# Patient Record
Sex: MALE | Race: WHITE | Employment: FULL TIME | URBAN - METROPOLITAN AREA
[De-identification: names, ages, dates, MRNs, and addresses within clinical notes are randomized per-mention and may not be internally consistent; named-entity substitution may affect disease eponyms.]

---

## 2018-01-26 ENCOUNTER — TELEPHONE (OUTPATIENT)
Dept: FAMILY MEDICINE CLINIC | Facility: CLINIC | Age: 53
End: 2018-01-26

## 2018-01-26 ENCOUNTER — OFFICE VISIT (OUTPATIENT)
Dept: FAMILY MEDICINE CLINIC | Facility: CLINIC | Age: 53
End: 2018-01-26

## 2018-01-26 VITALS
BODY MASS INDEX: 45.1 KG/M2 | DIASTOLIC BLOOD PRESSURE: 98 MMHG | HEIGHT: 70 IN | RESPIRATION RATE: 20 BRPM | OXYGEN SATURATION: 96 % | HEART RATE: 114 BPM | SYSTOLIC BLOOD PRESSURE: 144 MMHG | WEIGHT: 315 LBS | TEMPERATURE: 99 F

## 2018-01-26 DIAGNOSIS — Z13.21 SCREENING FOR ENDOCRINE, NUTRITIONAL, METABOLIC AND IMMUNITY DISORDER: ICD-10-CM

## 2018-01-26 DIAGNOSIS — Z13.228 SCREENING FOR ENDOCRINE, NUTRITIONAL, METABOLIC AND IMMUNITY DISORDER: ICD-10-CM

## 2018-01-26 DIAGNOSIS — G89.29 CHRONIC LEFT HIP PAIN: Primary | ICD-10-CM

## 2018-01-26 DIAGNOSIS — Z13.29 SCREENING FOR ENDOCRINE, NUTRITIONAL, METABOLIC AND IMMUNITY DISORDER: ICD-10-CM

## 2018-01-26 DIAGNOSIS — M25.562 CHRONIC PAIN OF LEFT KNEE: ICD-10-CM

## 2018-01-26 DIAGNOSIS — Z13.21 ENCOUNTER FOR VITAMIN DEFICIENCY SCREENING: ICD-10-CM

## 2018-01-26 DIAGNOSIS — Z99.89 OBSTRUCTIVE SLEEP APNEA ON CPAP: ICD-10-CM

## 2018-01-26 DIAGNOSIS — G47.33 OBSTRUCTIVE SLEEP APNEA ON CPAP: ICD-10-CM

## 2018-01-26 DIAGNOSIS — Z13.0 SCREENING FOR ENDOCRINE, NUTRITIONAL, METABOLIC AND IMMUNITY DISORDER: ICD-10-CM

## 2018-01-26 DIAGNOSIS — Z12.5 SCREENING FOR PROSTATE CANCER: ICD-10-CM

## 2018-01-26 DIAGNOSIS — E66.01 MORBID OBESITY WITH BMI OF 45.0-49.9, ADULT (HCC): ICD-10-CM

## 2018-01-26 DIAGNOSIS — I10 HYPERTENSION, UNCONTROLLED: ICD-10-CM

## 2018-01-26 DIAGNOSIS — G89.29 CHRONIC PAIN OF LEFT KNEE: ICD-10-CM

## 2018-01-26 DIAGNOSIS — M25.552 CHRONIC LEFT HIP PAIN: Primary | ICD-10-CM

## 2018-01-26 PROCEDURE — 99204 OFFICE O/P NEW MOD 45 MIN: CPT | Performed by: EMERGENCY MEDICINE

## 2018-01-26 RX ORDER — HYDROCODONE BITARTRATE AND ACETAMINOPHEN 5; 325 MG/1; MG/1
1 TABLET ORAL EVERY 6 HOURS PRN
Qty: 30 TABLET | Refills: 0 | Status: SHIPPED | OUTPATIENT
Start: 2018-01-26 | End: 2018-05-07

## 2018-01-26 RX ORDER — IBUPROFEN 800 MG/1
1600 TABLET ORAL EVERY 6 HOURS PRN
COMMUNITY
End: 2018-02-02 | Stop reason: ALTCHOICE

## 2018-01-26 RX ORDER — LISINOPRIL 10 MG/1
10 TABLET ORAL DAILY
Qty: 30 TABLET | Refills: 0 | Status: SHIPPED | OUTPATIENT
Start: 2018-01-26 | End: 2018-02-19

## 2018-01-26 NOTE — PATIENT INSTRUCTIONS
Thank you for choosing [address] [place] [address] Group  To Do:  FOR LIZZY HOLLOWAY  1. Xray today  2. Have ekg done in hospital  3. Have blood tests done after fasting  4. Take only 3-4 tabs every 6-8 hours as needed  5. Take norco for severe pain  6.  Ice to left kne healthiest for you. If you are overweight, a weight loss of only 3% to 5% of your body weight can help lower blood pressure. Generally, a good weight loss goal is to lose 10% of your body weight in a year. · Limit snacks and sweets.   · Get regular exercis or condition. Do you know your risk factors for high blood pressure? You can’t do anything about some risk factors. But other risk factors are things that can be changed. Know what high blood pressure risk factors you have.  Then find out what changes you c your healthcare professional's instructions.

## 2018-01-26 NOTE — PROGRESS NOTES
Chief Complaint:   Patient presents with:  Pain: LT hip, groin, knee x3 months    HPI:   This is a 50year old male       3 months ago fell while walking down a hill and slipped. Has had left hip and left knee pain. Pain waxing and waning.  Pain now more pe hydrated, no distress  SKIN: good skin turgor, no obvious rashes  HEENT: atraumatic, normocephalic, ears, nose and throat are clear  EYES: sclera non icteric bilateral  NECK: supple, no adenopathy, no thyromegaly  LUNGS: clear to auscultation, no RRW  CARD severe pain  Ice to left knee  Follow up in 1-2 weeks for annual physical  Restart lisinopril  Low salt diet  Need to start losing weight.   Overall decreased caloric intake in order to lose weight  Follow up with pulmonology re Sleep apnea

## 2018-01-27 ENCOUNTER — HOSPITAL ENCOUNTER (OUTPATIENT)
Dept: GENERAL RADIOLOGY | Age: 53
Discharge: HOME | End: 2018-01-27
Attending: EMERGENCY MEDICINE
Payer: COMMERCIAL

## 2018-01-27 DIAGNOSIS — G89.29 CHRONIC LEFT HIP PAIN: ICD-10-CM

## 2018-01-27 DIAGNOSIS — M25.552 CHRONIC LEFT HIP PAIN: ICD-10-CM

## 2018-01-27 DIAGNOSIS — M25.562 CHRONIC PAIN OF LEFT KNEE: ICD-10-CM

## 2018-01-27 DIAGNOSIS — G89.29 CHRONIC PAIN OF LEFT KNEE: ICD-10-CM

## 2018-01-27 PROCEDURE — 73560 X-RAY EXAM OF KNEE 1 OR 2: CPT | Performed by: EMERGENCY MEDICINE

## 2018-01-27 PROCEDURE — 73502 X-RAY EXAM HIP UNI 2-3 VIEWS: CPT | Performed by: EMERGENCY MEDICINE

## 2018-01-29 ENCOUNTER — TELEPHONE (OUTPATIENT)
Dept: FAMILY MEDICINE CLINIC | Facility: CLINIC | Age: 53
End: 2018-01-29

## 2018-01-29 DIAGNOSIS — M16.0 OSTEOARTHRITIS OF BOTH HIPS, UNSPECIFIED OSTEOARTHRITIS TYPE: Primary | ICD-10-CM

## 2018-01-29 NOTE — TELEPHONE ENCOUNTER
Patient aware of knee x-ray and hip x-rays. Advised of doctor recommendations for both. Verbalized understanding. Agreeable w/ POC. Contact information provided. Annual physical scheduled.

## 2018-01-29 NOTE — TELEPHONE ENCOUNTER
----- Message from Vamsi White MD sent at 1/29/2018 10:24 AM CST -----  + severe osteoarthritis of both hips, L>R  Pls refer patient to Orthopedics  Dr. Finn Gonsalez surgery  Sports medicine  Joint replacement    [facility] [[email or web address]]

## 2018-02-02 ENCOUNTER — OFFICE VISIT (OUTPATIENT)
Dept: FAMILY MEDICINE CLINIC | Facility: CLINIC | Age: 53
End: 2018-02-02

## 2018-02-02 VITALS
OXYGEN SATURATION: 98 % | RESPIRATION RATE: 20 BRPM | BODY MASS INDEX: 44.1 KG/M2 | HEART RATE: 121 BPM | SYSTOLIC BLOOD PRESSURE: 128 MMHG | HEIGHT: 71 IN | DIASTOLIC BLOOD PRESSURE: 92 MMHG | WEIGHT: 315 LBS | TEMPERATURE: 98 F

## 2018-02-02 DIAGNOSIS — M16.0 PRIMARY OSTEOARTHRITIS OF BOTH HIPS: ICD-10-CM

## 2018-02-02 DIAGNOSIS — I10 ESSENTIAL HYPERTENSION: ICD-10-CM

## 2018-02-02 DIAGNOSIS — Z00.00 ANNUAL PHYSICAL EXAM: Primary | ICD-10-CM

## 2018-02-02 DIAGNOSIS — Z23 NEEDS FLU SHOT: ICD-10-CM

## 2018-02-02 DIAGNOSIS — Z23 NEED FOR TDAP VACCINATION: ICD-10-CM

## 2018-02-02 DIAGNOSIS — E66.01 MORBID OBESITY WITH BODY MASS INDEX OF 50.0-59.9 IN ADULT (HCC): ICD-10-CM

## 2018-02-02 DIAGNOSIS — R00.0 TACHYCARDIA: ICD-10-CM

## 2018-02-02 DIAGNOSIS — Z12.11 SCREENING FOR COLON CANCER: ICD-10-CM

## 2018-02-02 PROCEDURE — 90471 IMMUNIZATION ADMIN: CPT | Performed by: EMERGENCY MEDICINE

## 2018-02-02 PROCEDURE — 90715 TDAP VACCINE 7 YRS/> IM: CPT | Performed by: EMERGENCY MEDICINE

## 2018-02-02 PROCEDURE — 90686 IIV4 VACC NO PRSV 0.5 ML IM: CPT | Performed by: EMERGENCY MEDICINE

## 2018-02-02 PROCEDURE — 90472 IMMUNIZATION ADMIN EACH ADD: CPT | Performed by: EMERGENCY MEDICINE

## 2018-02-02 PROCEDURE — 99396 PREV VISIT EST AGE 40-64: CPT | Performed by: EMERGENCY MEDICINE

## 2018-02-02 NOTE — PATIENT INSTRUCTIONS
Thank you for choosing [facility]  To Do:  FOR LIZZY HOLLOWAY  1. Have blood tests done  2. Arrange for ekg in [address]  3. Arrange for chest xray  4. Close follow up with weight loss clinic  5. Continue with lisinopril  6.  Follow up with pulmonolog infection – talk with your healthcare provider Check with your healthcare provider   Vision All men in this age group Every 2 to 4 years if no risk factors for eye disease2   Vaccine Who needs it How often   Chickenpox (varicella) All men in this age group infection – talk with your healthcare provider At routine exams   Use of daily aspirin Men ages 39 to 78 at risk for cardiovascular health problems At routine exams   Use of tobacco and the health effects it can cause All men in this age group Every exam body acts as if it’s on a desert [address]. It thinks food is scarce, so it slows down your metabolism (how fast you burn calories) to save energy. By eating too few calories, you make it harder to lose weight.   Fiction: “Once I lose weight, I can go back to you've reached it. A goal of reaching a BMI of less than 25 is not always reasonable (or possible).   Make an action plan  Habits don’t change overnight. Setting your goals too high can leave you feeling discouraged if you can’t reach them. Be realistic.  C less fat  A gram of fat has almost 2.5 times the calories of a gram of protein or carbohydrates. Try to balance your food choices so that only 20% to 35% of your calories comes from total fat.  This means an average of 2½ to 3½ grams of fat for each 100 shannon lose weight. And you can keep the weight off, if you make changes slowly and stick with them. Remember that you may never find the perfect time to lose weight. Decide that the right time to be healthier is now.   Common barriers  Barrier 1: I don’t want to 2/2/2016  © 1865-3129 [employer] [address]. [address] [facility] [place] [address] [place] [address]. All rights reserved. This information is not intended as a substitute for professional medical care. Always follow your healthcare professional's instructions.

## 2018-02-02 NOTE — PROGRESS NOTES
Chief Complaint:   Patient presents with:  Physical    HPI:   This is a 50year old male who present for a yearly annual exam    WELL-MALE EXAM     1. Age:   50   2. Have you had any of the following problems:         a.  High blood pressure      YES Heart Disorder Father        Allergies   No Known Allergies      Current Outpatient Prescriptions on File Prior to Visit:  lisinopril 10 MG Oral Tab Take 1 tablet (10 mg total) by mouth daily.  Disp: 30 tablet Rfl: 0   HYDROcodone-acetaminophen (NORCO) 5-32 recommended. Tetanus, Diptheria and Pertussis vaccine should be given every 7-10 years.   Call or come in if there are concerns regarding domestic abuse, sexually transmitted diseases, alcohol/drug addiction, depression/anxiety issues, or any further froylan

## 2018-02-19 DIAGNOSIS — I10 HYPERTENSION, UNCONTROLLED: ICD-10-CM

## 2018-02-20 RX ORDER — LISINOPRIL 10 MG/1
10 TABLET ORAL DAILY
Qty: 30 TABLET | Refills: 0 | Status: SHIPPED | OUTPATIENT
Start: 2018-02-20 | End: 2018-03-19

## 2018-02-24 PROCEDURE — 82272 OCCULT BLD FECES 1-3 TESTS: CPT | Performed by: EMERGENCY MEDICINE

## 2018-03-02 ENCOUNTER — APPOINTMENT (OUTPATIENT)
Dept: LAB | Age: 53
End: 2018-03-02
Attending: EMERGENCY MEDICINE
Payer: COMMERCIAL

## 2018-03-02 DIAGNOSIS — Z12.11 SCREENING FOR COLON CANCER: ICD-10-CM

## 2018-03-19 DIAGNOSIS — I10 HYPERTENSION, UNCONTROLLED: ICD-10-CM

## 2018-03-19 RX ORDER — LISINOPRIL 10 MG/1
TABLET ORAL
Qty: 30 TABLET | Refills: 1 | Status: SHIPPED | OUTPATIENT
Start: 2018-03-19 | End: 2018-05-06

## 2018-03-27 ENCOUNTER — LAB ENCOUNTER (OUTPATIENT)
Dept: LAB | Age: 53
End: 2018-03-27

## 2018-03-27 ENCOUNTER — OFFICE VISIT (OUTPATIENT)
Dept: INTERNAL MEDICINE CLINIC | Facility: CLINIC | Age: 53
End: 2018-03-27

## 2018-03-27 VITALS
HEIGHT: 70 IN | BODY MASS INDEX: 45.1 KG/M2 | HEART RATE: 80 BPM | SYSTOLIC BLOOD PRESSURE: 128 MMHG | WEIGHT: 315 LBS | DIASTOLIC BLOOD PRESSURE: 80 MMHG | RESPIRATION RATE: 16 BRPM

## 2018-03-27 DIAGNOSIS — R94.31 ABNORMAL ECG: ICD-10-CM

## 2018-03-27 DIAGNOSIS — Z51.81 THERAPEUTIC DRUG MONITORING: ICD-10-CM

## 2018-03-27 DIAGNOSIS — I10 ESSENTIAL HYPERTENSION: ICD-10-CM

## 2018-03-27 DIAGNOSIS — Z13.228 SCREENING FOR ENDOCRINE, NUTRITIONAL, METABOLIC AND IMMUNITY DISORDER: ICD-10-CM

## 2018-03-27 DIAGNOSIS — Z12.5 SCREENING FOR PROSTATE CANCER: ICD-10-CM

## 2018-03-27 DIAGNOSIS — E66.01 MORBID OBESITY WITH BMI OF 45.0-49.9, ADULT (HCC): ICD-10-CM

## 2018-03-27 DIAGNOSIS — M25.552 LEFT HIP PAIN: ICD-10-CM

## 2018-03-27 DIAGNOSIS — E78.5 HYPERLIPIDEMIA, UNSPECIFIED HYPERLIPIDEMIA TYPE: ICD-10-CM

## 2018-03-27 DIAGNOSIS — Z13.29 SCREENING FOR ENDOCRINE, NUTRITIONAL, METABOLIC AND IMMUNITY DISORDER: ICD-10-CM

## 2018-03-27 DIAGNOSIS — G47.33 OBSTRUCTIVE SLEEP APNEA SYNDROME: ICD-10-CM

## 2018-03-27 DIAGNOSIS — Z13.21 ENCOUNTER FOR VITAMIN DEFICIENCY SCREENING: ICD-10-CM

## 2018-03-27 DIAGNOSIS — M16.0 PRIMARY OSTEOARTHRITIS OF BOTH HIPS: Primary | ICD-10-CM

## 2018-03-27 DIAGNOSIS — Z13.21 SCREENING FOR ENDOCRINE, NUTRITIONAL, METABOLIC AND IMMUNITY DISORDER: ICD-10-CM

## 2018-03-27 DIAGNOSIS — Z13.0 SCREENING FOR ENDOCRINE, NUTRITIONAL, METABOLIC AND IMMUNITY DISORDER: ICD-10-CM

## 2018-03-27 LAB
25-HYDROXYVITAMIN D (TOTAL): 23.2 NG/ML (ref 30–100)
BASOPHILS # BLD AUTO: 0.04 X10(3) UL (ref 0–0.1)
BASOPHILS NFR BLD AUTO: 0.5 %
COMPLEXED PSA SERPL-MCNC: 1.81 NG/ML (ref 0.01–4)
EOSINOPHIL # BLD AUTO: 0.06 X10(3) UL (ref 0–0.3)
EOSINOPHIL NFR BLD AUTO: 0.7 %
ERYTHROCYTE [DISTWIDTH] IN BLOOD BY AUTOMATED COUNT: 13.3 % (ref 11.5–16)
EST. AVERAGE GLUCOSE BLD GHB EST-MCNC: 123 MG/DL (ref 68–126)
FOLATE (FOLIC ACID), SERUM: 35.2 NG/ML (ref 8.7–24)
HAV AB SERPL IA-ACNC: 775 PG/ML (ref 193–986)
HBA1C MFR BLD HPLC: 5.9 % (ref ?–5.7)
HCT VFR BLD AUTO: 44.1 % (ref 37–53)
HGB BLD-MCNC: 14.2 G/DL (ref 13–17)
IMMATURE GRANULOCYTE COUNT: 0.05 X10(3) UL (ref 0–1)
IMMATURE GRANULOCYTE RATIO %: 0.6 %
LARGE PLATELETS: PRESENT
LYMPHOCYTES # BLD AUTO: 2.52 X10(3) UL (ref 0.9–4)
LYMPHOCYTES NFR BLD AUTO: 29.1 %
MCH RBC QN AUTO: 29.4 PG (ref 27–33.2)
MCHC RBC AUTO-ENTMCNC: 32.2 G/DL (ref 31–37)
MCV RBC AUTO: 91.3 FL (ref 80–99)
MONOCYTES # BLD AUTO: 0.7 X10(3) UL (ref 0.1–1)
MONOCYTES NFR BLD AUTO: 8.1 %
MORPHOLOGY: NORMAL
NEUTROPHIL ABS PRELIM: 5.3 X10 (3) UL (ref 1.3–6.7)
NEUTROPHILS # BLD AUTO: 5.3 X10(3) UL (ref 1.3–6.7)
NEUTROPHILS NFR BLD AUTO: 61 %
PLATELET # BLD AUTO: 295 10(3)UL (ref 150–450)
RBC # BLD AUTO: 4.83 X10(6)UL (ref 4.3–5.7)
RED CELL DISTRIBUTION WIDTH-SD: 44.7 FL (ref 35.1–46.3)
TSI SER-ACNC: 1.84 MIU/ML (ref 0.35–5.5)
WBC # BLD AUTO: 8.7 X10(3) UL (ref 4–13)

## 2018-03-27 PROCEDURE — 82607 VITAMIN B-12: CPT | Performed by: INTERNAL MEDICINE

## 2018-03-27 PROCEDURE — 82746 ASSAY OF FOLIC ACID SERUM: CPT | Performed by: INTERNAL MEDICINE

## 2018-03-27 PROCEDURE — 99204 OFFICE O/P NEW MOD 45 MIN: CPT | Performed by: INTERNAL MEDICINE

## 2018-03-27 PROCEDURE — G0103 PSA SCREENING: HCPCS | Performed by: EMERGENCY MEDICINE

## 2018-03-27 PROCEDURE — 82397 CHEMILUMINESCENT ASSAY: CPT | Performed by: INTERNAL MEDICINE

## 2018-03-27 PROCEDURE — 83036 HEMOGLOBIN GLYCOSYLATED A1C: CPT | Performed by: EMERGENCY MEDICINE

## 2018-03-27 PROCEDURE — 85025 COMPLETE CBC W/AUTO DIFF WBC: CPT | Performed by: EMERGENCY MEDICINE

## 2018-03-27 PROCEDURE — 93000 ELECTROCARDIOGRAM COMPLETE: CPT | Performed by: INTERNAL MEDICINE

## 2018-03-27 PROCEDURE — 82306 VITAMIN D 25 HYDROXY: CPT | Performed by: EMERGENCY MEDICINE

## 2018-03-27 PROCEDURE — 84443 ASSAY THYROID STIM HORMONE: CPT | Performed by: EMERGENCY MEDICINE

## 2018-03-27 RX ORDER — TOPIRAMATE 25 MG/1
25 TABLET ORAL 2 TIMES DAILY
Qty: 60 TABLET | Refills: 0 | Status: SHIPPED | OUTPATIENT
Start: 2018-03-27 | End: 2018-04-25

## 2018-03-27 NOTE — PROGRESS NOTES
HISTORY OF PRESENT ILLNESS  Patient presents with:  Weight Problem: started eating breakfast and protien powder, needs hip sx       Angie Gibson is a 50year old male new to our office today for initiation of medical weight loss program.  Patient present negative   Constipation: negative  DVT: negative    Family or personal history of Pancreatic issues / Medullary Thyroid Cancer: negative    History of bariatric surgery: negative     [address] reviewed: obesity in parent/s or sibling: YES     REVIEW OF SYSTEMS 11/21/2014   K 4.1 11/21/2014    11/21/2014   CO2 28 11/21/2014       Lab Results  Component Value Date    03/27/2018   A1C 5.9 (H) 03/27/2018       Lab Results  Component Value Date   CHOLEST 177 11/21/2014   HDL 72 11/21/2014   LDL 96 11/21/ REFERRAL TO [place] [facility]  -     CARD ECHO STRESS ECHO/REST AND STRESS(CPT=93350/34937 [facility] 69127); Future    Obstructive sleep apnea syndrome  -     CARD ECHO STRESS ECHO/REST AND STRESS(CPT=93350/96461 [facility] 00124);  Future    Therapeutic d boiled egg, any eggs, whole grain toast or english muffin with peanut butter  3. Reduce carbohydrates which includes sweets as well as rice, pasta, potatoes and bread and choose whole grain options when having them    Please download garrett \"My Fitness Pal\"

## 2018-03-29 LAB — LEPTIN: 27.2 NG/ML

## 2018-04-02 ENCOUNTER — TELEPHONE (OUTPATIENT)
Dept: FAMILY MEDICINE CLINIC | Facility: CLINIC | Age: 53
End: 2018-04-02

## 2018-04-02 DIAGNOSIS — E55.9 VITAMIN D DEFICIENCY: Primary | ICD-10-CM

## 2018-04-02 RX ORDER — ERGOCALCIFEROL 1.25 MG/1
50000 CAPSULE ORAL WEEKLY
Qty: 4 CAPSULE | Refills: 2 | Status: SHIPPED | OUTPATIENT
Start: 2018-04-02 | End: 2018-09-17

## 2018-04-02 NOTE — TELEPHONE ENCOUNTER
Discussed all results with patient understanding voiced and he states he will return to get LIPID and CMP done as he was not fasting when these labs were drawn.

## 2018-04-02 NOTE — TELEPHONE ENCOUNTER
----- Message from Mitch Gutierrez MD sent at 4/1/2018  8:10 PM CDT -----  Low vitamin D, needs vitamin D 50,000 units q weekly #4 with 2 refills then, OTC VIT D 9962-1679 units q daily maintenance   Repeat Vitamin D in 6 months.      Rest of labs stable

## 2018-04-17 ENCOUNTER — OFFICE VISIT (OUTPATIENT)
Dept: INTERNAL MEDICINE CLINIC | Facility: CLINIC | Age: 53
End: 2018-04-17

## 2018-04-17 DIAGNOSIS — E66.01 CLASS 3 SEVERE OBESITY DUE TO EXCESS CALORIES WITH SERIOUS COMORBIDITY AND BODY MASS INDEX (BMI) OF 50.0 TO 59.9 IN ADULT (HCC): Primary | ICD-10-CM

## 2018-04-17 PROCEDURE — 97802 MEDICAL NUTRITION INDIV IN: CPT | Performed by: DIETITIAN, REGISTERED

## 2018-04-18 VITALS — WEIGHT: 315 LBS | BODY MASS INDEX: 55 KG/M2

## 2018-04-18 NOTE — PROGRESS NOTES
INITIAL OUTPATIENT NUTRITION CONSULTATION    Nutrition Assessment    Medical Diagnosis: Obesity    Physical Findings: knee pain    Client Age and Gender: 50year old male    Marital Status and Occupation: Single.   Works from home as  in [employer]   Comment:     Comment:    HDL Cholesterol values >59 mg/dL are associated with reduced     cardiac risk.                             ----------    AST (SGOT)   Date Value Ref Range Status   11/21/2014 14 0 - 40 international units per liter Final education and evaluation provided for weight loss. Pt started drinking protein shakes for breakfast in January and lost 20 pounds prior to first appt at [facility] 3/27. Since first appt has started using [employer] garrett to track food intake as advised by Dr. Mara Martin.   Pt

## 2018-04-19 RX ORDER — TOPIRAMATE 25 MG/1
TABLET ORAL
Qty: 60 TABLET | Refills: 0 | OUTPATIENT
Start: 2018-04-19

## 2018-04-19 NOTE — TELEPHONE ENCOUNTER
Topiramate refill denied. Patient has a follow up appt on 4/25/18 with Dr. Kelly Norman and will be refilled at that time if continued.     Future Appointments  Date Time Provider Jazmyn Torres   4/25/2018 8:20 AM Tanya Root MD [address]   5/4/[birth year]

## 2018-04-25 ENCOUNTER — OFFICE VISIT (OUTPATIENT)
Dept: INTERNAL MEDICINE CLINIC | Facility: CLINIC | Age: 53
End: 2018-04-25

## 2018-04-25 VITALS
HEIGHT: 70 IN | HEART RATE: 84 BPM | BODY MASS INDEX: 45.1 KG/M2 | DIASTOLIC BLOOD PRESSURE: 80 MMHG | SYSTOLIC BLOOD PRESSURE: 130 MMHG | WEIGHT: 315 LBS | RESPIRATION RATE: 16 BRPM

## 2018-04-25 DIAGNOSIS — Z51.81 THERAPEUTIC DRUG MONITORING: Primary | ICD-10-CM

## 2018-04-25 DIAGNOSIS — E66.01 OBESITY, MORBID, BMI 50 OR HIGHER (HCC): ICD-10-CM

## 2018-04-25 PROCEDURE — 99213 OFFICE O/P EST LOW 20 MIN: CPT | Performed by: INTERNAL MEDICINE

## 2018-04-25 RX ORDER — TOPIRAMATE 25 MG/1
25 TABLET ORAL 2 TIMES DAILY
Qty: 60 TABLET | Refills: 0 | Status: SHIPPED | OUTPATIENT
Start: 2018-04-25 | End: 2018-05-21

## 2018-04-25 NOTE — PROGRESS NOTES
HISTORY OF PRESENT ILLNESS  Patient presents with:  Weight Check: down 9 lb      Keiry Madison is a 50year old male here for follow up in medical weight loss program.     Denies chest pain, shortness of breath, dizziness, blurred vision, headache, parest Results  Component Value Date   CHOLEST 177 11/21/2014   HDL 72 11/21/2014   LDL 96 11/21/2014   VLDL 9 11/21/2014   NONHDLC 105 11/21/2014       Lab Results  Component Value Date   TSH 1.840 03/27/2018       Lab Results  Component Value Date   B12 775 03/ as recommended. · Discussed the role of sleep and stress in weight management. · Counseled on comprehensive weight loss plan including attention to nutrition, exercise and behavior/stress management for success.  See patient instruction below for more det

## 2018-05-06 DIAGNOSIS — I10 HYPERTENSION, UNCONTROLLED: ICD-10-CM

## 2018-05-07 RX ORDER — LISINOPRIL 10 MG/1
TABLET ORAL
Qty: 30 TABLET | Refills: 0 | Status: SHIPPED | OUTPATIENT
Start: 2018-05-07 | End: 2018-05-30

## 2018-05-17 ENCOUNTER — OFFICE VISIT (OUTPATIENT)
Dept: FAMILY MEDICINE CLINIC | Facility: CLINIC | Age: 53
End: 2018-05-17

## 2018-05-17 VITALS
BODY MASS INDEX: 45.1 KG/M2 | RESPIRATION RATE: 16 BRPM | WEIGHT: 315 LBS | TEMPERATURE: 99 F | SYSTOLIC BLOOD PRESSURE: 130 MMHG | HEIGHT: 70 IN | HEART RATE: 100 BPM | OXYGEN SATURATION: 98 % | DIASTOLIC BLOOD PRESSURE: 80 MMHG

## 2018-05-17 DIAGNOSIS — I10 ESSENTIAL HYPERTENSION: Primary | ICD-10-CM

## 2018-05-17 DIAGNOSIS — E66.01 MORBID OBESITY (HCC): ICD-10-CM

## 2018-05-17 PROCEDURE — 99213 OFFICE O/P EST LOW 20 MIN: CPT | Performed by: EMERGENCY MEDICINE

## 2018-05-17 NOTE — PATIENT INSTRUCTIONS
Thank you for choosing [facility]  To Do:  FOR LIZZY HOLLOWAY  1. Continue with weight loss program  2. Continue with lisinopril  3. Have blood test done for cholesterol  4. Follow up in 6 months  5.  Low salt diet  6. HOme BP monitoring 2 times a This information is not intended as a substitute for professional medical care. Always follow your healthcare professional's instructions.

## 2018-05-17 NOTE — PROGRESS NOTES
Chief Complaint:   Patient presents with:  Weight Check: f/u    HPI:   This is a 50year old male     FF UP WEIGHT LOSS  Hs lost total 40 lbs. Is on Topamax. Follows up with weight loss clinic, Dr. Marline Engle. Known with severe hip osteoarthritis.    Feels well t systems is negative except those stated as above    PHYSICAL EXAM:   /80   Pulse 100   Temp 99 °F (37.2 °C) (Oral)   Resp 16   Ht 70\"   Wt (!) 368 lb   SpO2 98%   BMI 52.80 kg/m²  Estimated body mass index is 52.8 kg/m² as calculated from the follow clinic.         PATIENT INSTRUCTIONS:  Continue with weight loss program  Continue with lisinopril  Have blood test done for cholesterol  Follow up in 6 months  Low salt diet  HOme BP monitoring 2 times a week    FOLLOW UP: 6 months

## 2018-05-21 ENCOUNTER — OFFICE VISIT (OUTPATIENT)
Dept: INTERNAL MEDICINE CLINIC | Facility: CLINIC | Age: 53
End: 2018-05-21

## 2018-05-21 VITALS
BODY MASS INDEX: 45.1 KG/M2 | HEART RATE: 84 BPM | RESPIRATION RATE: 16 BRPM | SYSTOLIC BLOOD PRESSURE: 126 MMHG | DIASTOLIC BLOOD PRESSURE: 80 MMHG | WEIGHT: 315 LBS | HEIGHT: 70 IN

## 2018-05-21 DIAGNOSIS — Z51.81 THERAPEUTIC DRUG MONITORING: Primary | ICD-10-CM

## 2018-05-21 DIAGNOSIS — G47.33 OBSTRUCTIVE SLEEP APNEA SYNDROME: ICD-10-CM

## 2018-05-21 DIAGNOSIS — M16.0 PRIMARY OSTEOARTHRITIS OF BOTH HIPS: ICD-10-CM

## 2018-05-21 PROCEDURE — 99214 OFFICE O/P EST MOD 30 MIN: CPT | Performed by: INTERNAL MEDICINE

## 2018-05-21 RX ORDER — TOPIRAMATE 25 MG/1
25 TABLET ORAL 2 TIMES DAILY
Qty: 60 TABLET | Refills: 0 | Status: SHIPPED | OUTPATIENT
Start: 2018-05-21 | End: 2018-06-20

## 2018-05-21 NOTE — PROGRESS NOTES
HISTORY OF PRESENT ILLNESS  Patient presents with:  Weight Check: down 11 lb      Sheryl Fowler is a 50year old male here for follow up in medical weight loss program.     Denies chest pain, shortness of breath, dizziness, blurred vision, headache, pares 11/21/2014   CO2 28 11/21/2014       Lab Results  Component Value Date    03/27/2018   A1C 5.9 (H) 03/27/2018       Lab Results  Component Value Date   CHOLEST 177 11/21/2014   HDL 72 11/21/2014   LDL 96 11/21/2014   VLDL 9 11/21/2014   NONHDLC 105 work towards complete reversal of the sleep apnea  · Nutrition: low carb diet/ recommended to eat breakfast daily/ regular protein intake  · Medication use and side effects reviewed with patient.   Medication contraindications: n/a   · Follow up with dietit

## 2018-05-30 ENCOUNTER — OFFICE VISIT (OUTPATIENT)
Dept: INTERNAL MEDICINE CLINIC | Facility: CLINIC | Age: 53
End: 2018-05-30

## 2018-05-30 DIAGNOSIS — E66.2 CLASS 3 OBESITY WITH ALVEOLAR HYPOVENTILATION, SERIOUS COMORBIDITY, AND BODY MASS INDEX (BMI) OF 50.0 TO 59.9 IN ADULT (HCC): Primary | ICD-10-CM

## 2018-05-30 DIAGNOSIS — I10 HYPERTENSION, UNCONTROLLED: ICD-10-CM

## 2018-05-30 PROCEDURE — 97803 MED NUTRITION INDIV SUBSEQ: CPT | Performed by: DIETITIAN, REGISTERED

## 2018-05-30 RX ORDER — LISINOPRIL 10 MG/1
TABLET ORAL
Qty: 90 TABLET | Refills: 1 | Status: SHIPPED | OUTPATIENT
Start: 2018-05-30 | End: 2018-11-19

## 2018-05-30 NOTE — TELEPHONE ENCOUNTER
Medication(s) to Refill:   Pending Prescriptions Disp Refills    LISINOPRIL 10 MG Oral Tab [Pharmacy Med Name: LISINOPRIL 10MG TABLETS] 30 tablet 0     Sig: TAKE 1 TABLET(10 MG) BY MOUTH DAILY             Reason for Medication Refill being sent to Provider

## 2018-05-31 VITALS — BODY MASS INDEX: 52 KG/M2 | WEIGHT: 315 LBS

## 2018-05-31 NOTE — PROGRESS NOTES
FOLLOW UP NUTRITION CONSULTATION    Nutrition Assessment    Number of consultations with dietitian: 2    Height:  Ht Readings from Last 1 Encounters:  05/21/18 : 70\"      Weight:   Wt Readings from Last 2 Encounters:  05/30/18 : (!) 361 lb  05/21/18 :

## 2018-06-20 ENCOUNTER — OFFICE VISIT (OUTPATIENT)
Dept: INTERNAL MEDICINE CLINIC | Facility: CLINIC | Age: 53
End: 2018-06-20

## 2018-06-20 VITALS
HEART RATE: 80 BPM | HEIGHT: 70 IN | SYSTOLIC BLOOD PRESSURE: 118 MMHG | WEIGHT: 315 LBS | RESPIRATION RATE: 16 BRPM | DIASTOLIC BLOOD PRESSURE: 80 MMHG | BODY MASS INDEX: 45.1 KG/M2

## 2018-06-20 DIAGNOSIS — Z51.81 THERAPEUTIC DRUG MONITORING: Primary | ICD-10-CM

## 2018-06-20 PROCEDURE — 99213 OFFICE O/P EST LOW 20 MIN: CPT | Performed by: INTERNAL MEDICINE

## 2018-06-20 RX ORDER — TOPIRAMATE 25 MG/1
25 TABLET ORAL 2 TIMES DAILY
Qty: 60 TABLET | Refills: 0 | Status: SHIPPED | OUTPATIENT
Start: 2018-06-20 | End: 2018-07-16

## 2018-06-20 NOTE — PROGRESS NOTES
HISTORY OF PRESENT ILLNESS  Patient presents with:  Weight Check: down 17 lb      Fahad Higuera is a 50year old male here for follow up in medical weight loss program.     Denies chest pain, shortness of breath, dizziness, blurred vision, headache, pares Results  Component Value Date    03/27/2018   A1C 5.9 (H) 03/27/2018       Lab Results  Component Value Date   CHOLEST 177 11/21/2014   HDL 72 11/21/2014   LDL 96 11/21/2014   VLDL 9 11/21/2014   NONHDLC 105 11/21/2014       Lab Results  Component V carb diet/ recommended to eat breakfast daily/ regular protein intake  · Medication use and side effects reviewed with patient. Medication contraindications: n/a   · Follow up with dietitian and psychologist as recommended.   · Discussed the role of sleep

## 2018-07-16 ENCOUNTER — OFFICE VISIT (OUTPATIENT)
Dept: INTERNAL MEDICINE CLINIC | Facility: CLINIC | Age: 53
End: 2018-07-16

## 2018-07-16 VITALS
HEART RATE: 78 BPM | BODY MASS INDEX: 45.1 KG/M2 | WEIGHT: 315 LBS | HEIGHT: 70 IN | RESPIRATION RATE: 16 BRPM | DIASTOLIC BLOOD PRESSURE: 78 MMHG | SYSTOLIC BLOOD PRESSURE: 130 MMHG

## 2018-07-16 DIAGNOSIS — M16.0 PRIMARY OSTEOARTHRITIS OF BOTH HIPS: ICD-10-CM

## 2018-07-16 DIAGNOSIS — Z51.81 THERAPEUTIC DRUG MONITORING: Primary | ICD-10-CM

## 2018-07-16 DIAGNOSIS — I10 ESSENTIAL HYPERTENSION: ICD-10-CM

## 2018-07-16 PROCEDURE — 99214 OFFICE O/P EST MOD 30 MIN: CPT | Performed by: INTERNAL MEDICINE

## 2018-07-16 RX ORDER — TOPIRAMATE 25 MG/1
25 TABLET ORAL 2 TIMES DAILY
Qty: 60 TABLET | Refills: 0 | Status: SHIPPED | OUTPATIENT
Start: 2018-07-16 | End: 2018-08-13

## 2018-07-16 NOTE — PROGRESS NOTES
HISTORY OF PRESENT ILLNESS  Patient presents with:  Weight Check: down 16 lb      Kodi Freedman is a 50year old male here for follow up in medical weight loss program.     Denies chest pain, shortness of breath, dizziness, blurred vision, headache, pares 11/21/2014   TP 6.7 11/21/2014   ALB 4.3 11/21/2014   AGRATIO 1.8 11/21/2014    11/21/2014   K 4.1 11/21/2014    11/21/2014   CO2 28 11/21/2014       Lab Results  Component Value Date    03/27/2018   A1C 5.9 (H) 03/27/2018       Lab Resu aqua therapy for the hip.  Not interested at this itme, interested in seeing surgeon we dicussed when BMI near 40   · Down 10% of total body weight, doing very well, we reviewed over the next 40-60 lb hopefully we can work towards complete reversal of the s

## 2018-08-13 ENCOUNTER — OFFICE VISIT (OUTPATIENT)
Dept: INTERNAL MEDICINE CLINIC | Facility: CLINIC | Age: 53
End: 2018-08-13
Payer: COMMERCIAL

## 2018-08-13 VITALS
RESPIRATION RATE: 16 BRPM | HEART RATE: 84 BPM | SYSTOLIC BLOOD PRESSURE: 122 MMHG | BODY MASS INDEX: 45.1 KG/M2 | HEIGHT: 70 IN | DIASTOLIC BLOOD PRESSURE: 78 MMHG | WEIGHT: 315 LBS

## 2018-08-13 DIAGNOSIS — M16.0 PRIMARY OSTEOARTHRITIS OF BOTH HIPS: ICD-10-CM

## 2018-08-13 DIAGNOSIS — Z51.81 THERAPEUTIC DRUG MONITORING: Primary | ICD-10-CM

## 2018-08-13 DIAGNOSIS — E66.01 MORBID OBESITY (HCC): ICD-10-CM

## 2018-08-13 PROCEDURE — 99213 OFFICE O/P EST LOW 20 MIN: CPT | Performed by: INTERNAL MEDICINE

## 2018-08-13 RX ORDER — TOPIRAMATE 25 MG/1
25 TABLET ORAL 2 TIMES DAILY
Qty: 60 TABLET | Refills: 0 | Status: SHIPPED | OUTPATIENT
Start: 2018-08-13 | End: 2018-09-17

## 2018-08-13 RX ORDER — TOPIRAMATE 25 MG/1
25 TABLET ORAL 2 TIMES DAILY
Qty: 60 TABLET | Refills: 0 | Status: SHIPPED | OUTPATIENT
Start: 2018-08-13 | End: 2018-08-13

## 2018-08-13 NOTE — PROGRESS NOTES
HISTORY OF PRESENT ILLNESS  Patient presents with:  Weight Check: down 15 lb      Angelito Harris is a 50year old male here for follow up in medical weight loss program.     Denies chest pain, shortness of breath, dizziness, blurred vision, headache, pares 4.1 11/21/2014    11/21/2014   CO2 28 11/21/2014       Lab Results  Component Value Date    03/27/2018   A1C 5.9 (H) 03/27/2018       Lab Results  Component Value Date   CHOLEST 177 11/21/2014   HDL 72 11/21/2014   LDL 96 11/21/2014   VLDL 9 1 MFP , really committed to tracking. · Making healthy choices on the go as well as with travel  · Continues to be very motivated. · Plans to start strength training, given information for physical therapy program.  · Blood pressure running great.    · Re

## 2018-08-15 ENCOUNTER — OFFICE VISIT (OUTPATIENT)
Dept: INTERNAL MEDICINE CLINIC | Facility: CLINIC | Age: 53
End: 2018-08-15
Payer: COMMERCIAL

## 2018-08-15 DIAGNOSIS — E66.2 CLASS 3 OBESITY WITH ALVEOLAR HYPOVENTILATION, SERIOUS COMORBIDITY, AND BODY MASS INDEX (BMI) OF 45.0 TO 49.9 IN ADULT (HCC): Primary | ICD-10-CM

## 2018-08-15 PROCEDURE — 97803 MED NUTRITION INDIV SUBSEQ: CPT | Performed by: DIETITIAN, REGISTERED

## 2018-08-16 NOTE — PROGRESS NOTES
FOLLOW UP NUTRITION CONSULTATION    Nutrition Assessment    Number of consultations with dietitian: 3    Height:  Ht Readings from Last 1 Encounters:  08/13/18 : 70\"      Weight:   Wt Readings from Last 2 Encounters:  08/13/18 : (!) 320 lb  07/16/18 :

## 2018-09-17 ENCOUNTER — OFFICE VISIT (OUTPATIENT)
Dept: INTERNAL MEDICINE CLINIC | Facility: CLINIC | Age: 53
End: 2018-09-17
Payer: COMMERCIAL

## 2018-09-17 VITALS
SYSTOLIC BLOOD PRESSURE: 126 MMHG | BODY MASS INDEX: 43.52 KG/M2 | HEIGHT: 70 IN | HEART RATE: 116 BPM | WEIGHT: 304 LBS | DIASTOLIC BLOOD PRESSURE: 82 MMHG | RESPIRATION RATE: 16 BRPM

## 2018-09-17 DIAGNOSIS — E66.01 MORBID OBESITY (HCC): ICD-10-CM

## 2018-09-17 DIAGNOSIS — Z51.81 THERAPEUTIC DRUG MONITORING: Primary | ICD-10-CM

## 2018-09-17 DIAGNOSIS — E66.2 CLASS 3 OBESITY WITH ALVEOLAR HYPOVENTILATION, SERIOUS COMORBIDITY, AND BODY MASS INDEX (BMI) OF 45.0 TO 49.9 IN ADULT (HCC): ICD-10-CM

## 2018-09-17 PROCEDURE — 99213 OFFICE O/P EST LOW 20 MIN: CPT | Performed by: INTERNAL MEDICINE

## 2018-09-17 RX ORDER — TOPIRAMATE 25 MG/1
25 TABLET ORAL 2 TIMES DAILY
Qty: 60 TABLET | Refills: 0 | Status: SHIPPED | OUTPATIENT
Start: 2018-09-17 | End: 2018-10-15

## 2018-09-17 NOTE — PROGRESS NOTES
HISTORY OF PRESENT ILLNESS  Patient presents with:  Weight Check: down 16 lbs      Isabella Lozano is a 50year old male here for follow up in medical weight loss program.     Denies chest pain, shortness of breath, dizziness, blurred vision, headache, pare  11/21/2014    K 4.1 11/21/2014     11/21/2014    CO2 28 11/21/2014     Lab Results   Component Value Date     03/27/2018    A1C 5.9 (H) 03/27/2018     Lab Results   Component Value Date    CHOLEST 177 11/21/2014    HDL 72 11/21/2014 aqua therapy for the hip. · Plans to see orthopedic surgeon next week  · Nutrition: low carb diet/ recommended to eat breakfast daily/ regular protein intake  · Medication use and side effects reviewed with patient.   Medication contraindications: n/a   ·

## 2018-10-15 ENCOUNTER — OFFICE VISIT (OUTPATIENT)
Dept: INTERNAL MEDICINE CLINIC | Facility: CLINIC | Age: 53
End: 2018-10-15
Payer: COMMERCIAL

## 2018-10-15 VITALS
DIASTOLIC BLOOD PRESSURE: 82 MMHG | HEIGHT: 70 IN | WEIGHT: 292 LBS | HEART RATE: 102 BPM | RESPIRATION RATE: 16 BRPM | BODY MASS INDEX: 41.8 KG/M2 | SYSTOLIC BLOOD PRESSURE: 136 MMHG

## 2018-10-15 DIAGNOSIS — E66.01 MORBID OBESITY (HCC): ICD-10-CM

## 2018-10-15 DIAGNOSIS — Z51.81 THERAPEUTIC DRUG MONITORING: Primary | ICD-10-CM

## 2018-10-15 PROCEDURE — 99213 OFFICE O/P EST LOW 20 MIN: CPT | Performed by: INTERNAL MEDICINE

## 2018-10-15 RX ORDER — TOPIRAMATE 25 MG/1
25 TABLET ORAL 2 TIMES DAILY
Qty: 60 TABLET | Refills: 0 | Status: SHIPPED | OUTPATIENT
Start: 2018-10-15 | End: 2018-11-12

## 2018-10-16 NOTE — PROGRESS NOTES
HISTORY OF PRESENT ILLNESS  Patient presents with:  Weight Check: down 12 lbs      Fahad Higuera is a 50year old male here for follow up in medical weight loss program.     Denies chest pain, shortness of breath, dizziness, blurred vision, headache, pare Component Value Date     03/27/2018    A1C 5.9 (H) 03/27/2018     Lab Results   Component Value Date    CHOLEST 177 11/21/2014    HDL 72 11/21/2014    LDL 96 11/21/2014    VLDL 9 11/21/2014    NONHDLC 105 11/21/2014     Lab Results   Component Eli effects reviewed with patient. Medication contraindications: n/a   · Follow up with dietitian and psychologist as recommended. · Discussed the role of sleep and stress in weight management.   · Counseled on comprehensive weight loss plan including attenti

## 2018-11-01 ENCOUNTER — TELEPHONE (OUTPATIENT)
Dept: FAMILY MEDICINE CLINIC | Facility: CLINIC | Age: 53
End: 2018-11-01

## 2018-11-01 NOTE — TELEPHONE ENCOUNTER
Pt scheduled Pre op surgery appt for his surgery on 2/7/19. Pink sheet was filled out an placed in doctors deguzmans folder.

## 2018-11-08 ENCOUNTER — OFFICE VISIT (OUTPATIENT)
Dept: INTERNAL MEDICINE CLINIC | Facility: CLINIC | Age: 53
End: 2018-11-08
Payer: COMMERCIAL

## 2018-11-08 VITALS — WEIGHT: 280.63 LBS | BODY MASS INDEX: 40 KG/M2

## 2018-11-08 DIAGNOSIS — E66.01 OBESITY, CLASS III, BMI 40-49.9 (MORBID OBESITY) (HCC): Primary | ICD-10-CM

## 2018-11-08 PROCEDURE — 97803 MED NUTRITION INDIV SUBSEQ: CPT | Performed by: DIETITIAN, REGISTERED

## 2018-11-08 NOTE — PROGRESS NOTES
FOLLOW UP NUTRITION CONSULTATION    Nutrition Assessment    Number of consultations with dietitian: 4    Height:  Ht Readings from Last 1 Encounters:  10/24/18 : 70\"      Weight:   Wt Readings from Last 2 Encounters:  11/08/18 : 280 lb 9.6 oz  10/24/18

## 2018-11-12 ENCOUNTER — OFFICE VISIT (OUTPATIENT)
Dept: INTERNAL MEDICINE CLINIC | Facility: CLINIC | Age: 53
End: 2018-11-12
Payer: COMMERCIAL

## 2018-11-12 VITALS
SYSTOLIC BLOOD PRESSURE: 120 MMHG | WEIGHT: 278 LBS | RESPIRATION RATE: 16 BRPM | HEART RATE: 98 BPM | BODY MASS INDEX: 39.8 KG/M2 | HEIGHT: 70 IN | DIASTOLIC BLOOD PRESSURE: 82 MMHG

## 2018-11-12 DIAGNOSIS — E66.01 MORBID OBESITY (HCC): ICD-10-CM

## 2018-11-12 DIAGNOSIS — Z51.81 THERAPEUTIC DRUG MONITORING: Primary | ICD-10-CM

## 2018-11-12 PROCEDURE — 99213 OFFICE O/P EST LOW 20 MIN: CPT | Performed by: INTERNAL MEDICINE

## 2018-11-12 RX ORDER — TOPIRAMATE 25 MG/1
25 TABLET ORAL 2 TIMES DAILY
Qty: 60 TABLET | Refills: 0 | Status: SHIPPED | OUTPATIENT
Start: 2018-11-12 | End: 2018-12-19

## 2018-11-12 NOTE — PROGRESS NOTES
HISTORY OF PRESENT ILLNESS  Patient presents with:  Weight Check: down 14 lbs      Raymundo Echavarria is a 50year old male here for follow up in medical weight loss program.     Denies chest pain, shortness of breath, dizziness, blurred vision, headache, pare  03/27/2018    A1C 5.9 (H) 03/27/2018     Lab Results   Component Value Date    CHOLEST 177 11/21/2014    HDL 72 11/21/2014    LDL 96 11/21/2014    VLDL 9 11/21/2014    NONHDLC 105 11/21/2014     Lab Results   Component Value Date    TSH 1.840 03 dietitian and psychologist as recommended. · Discussed the role of sleep and stress in weight management. · Counseled on comprehensive weight loss plan including attention to nutrition, exercise and behavior/stress management for success.  See patient ins

## 2018-11-19 ENCOUNTER — OFFICE VISIT (OUTPATIENT)
Dept: FAMILY MEDICINE CLINIC | Facility: CLINIC | Age: 53
End: 2018-11-19
Payer: COMMERCIAL

## 2018-11-19 VITALS
SYSTOLIC BLOOD PRESSURE: 110 MMHG | RESPIRATION RATE: 17 BRPM | OXYGEN SATURATION: 98 % | BODY MASS INDEX: 40 KG/M2 | HEART RATE: 82 BPM | DIASTOLIC BLOOD PRESSURE: 84 MMHG | WEIGHT: 279 LBS

## 2018-11-19 DIAGNOSIS — I10 HYPERTENSION, UNSPECIFIED TYPE: Primary | ICD-10-CM

## 2018-11-19 DIAGNOSIS — E66.9 OBESITY (BMI 30-39.9): ICD-10-CM

## 2018-11-19 DIAGNOSIS — Z23 NEED FOR VACCINATION: ICD-10-CM

## 2018-11-19 PROCEDURE — 99213 OFFICE O/P EST LOW 20 MIN: CPT | Performed by: EMERGENCY MEDICINE

## 2018-11-19 PROCEDURE — 90471 IMMUNIZATION ADMIN: CPT | Performed by: EMERGENCY MEDICINE

## 2018-11-19 PROCEDURE — 90686 IIV4 VACC NO PRSV 0.5 ML IM: CPT | Performed by: EMERGENCY MEDICINE

## 2018-11-19 NOTE — PROGRESS NOTES
Chief Complaint:   Patient presents with:  Blood Pressure: F/u     HPI:   This is a 50year old male       HYPERTENSION  BP checks at home, 120's/80. No cough. Feels well. No new complains. C/O conitnued hip pain.     OBESITY  DOing well  Losing weight promise weight loss  The rest of the review of systems is negative except those stated as above    PHYSICAL EXAM:   /84   Pulse 82   Resp 17   Ht (P) 70\"   Wt 279 lb   SpO2 98%   BMI (P) 40.03 kg/m²  Estimated body mass index is 40.03 kg/m² (pended) as calc

## 2018-11-19 NOTE — PATIENT INSTRUCTIONS
Thank you for choosing [facility]  To Do:  FOR LIZZY HOLLOWAY    · Follow up for pre op physical  · continue with weight loss  · Continue with Lisinopril  · Arrange for stress echo as prev ordered                            Taking ACE Inhibitors face, mouth, lips, arms, lower legs, ankles, or feet. These may be signs of an allergic reaction. · You have any other unusual symptoms. · Tell your healthcare provider if you wish to become pregnant or think you may be pregnant.  ACE inhibitors can cause

## 2018-11-20 RX ORDER — LISINOPRIL 10 MG/1
TABLET ORAL
Qty: 90 TABLET | Refills: 0 | Status: ON HOLD | OUTPATIENT
Start: 2018-11-20 | End: 2019-02-07

## 2018-11-21 ENCOUNTER — TELEPHONE (OUTPATIENT)
Dept: FAMILY MEDICINE CLINIC | Facility: CLINIC | Age: 53
End: 2018-11-21

## 2018-11-21 NOTE — TELEPHONE ENCOUNTER
Pt requesting placard for disability parking. Pt is scheduled to have hip sx with Dr. Williams Wayne 2/7/19. Okay to fill? If so, what diagnosis?

## 2018-11-21 NOTE — TELEPHONE ENCOUNTER
Patient is wanting to ask a question regarding parking for disability. The patient is wanting a temporary parking placard. With it becoming winter it is getting harder and harder with his hips being replaced over the next couple months.

## 2018-11-29 ENCOUNTER — TELEPHONE (OUTPATIENT)
Dept: INTERNAL MEDICINE CLINIC | Facility: CLINIC | Age: 53
End: 2018-11-29

## 2018-11-29 DIAGNOSIS — R94.31 ABNORMAL ECG: ICD-10-CM

## 2018-11-29 DIAGNOSIS — Z51.81 THERAPEUTIC DRUG MONITORING: ICD-10-CM

## 2018-11-29 DIAGNOSIS — G47.33 OSA (OBSTRUCTIVE SLEEP APNEA): ICD-10-CM

## 2018-11-29 DIAGNOSIS — M16.0 PRIMARY OSTEOARTHRITIS OF BOTH HIPS: ICD-10-CM

## 2018-11-29 DIAGNOSIS — I10 ESSENTIAL HYPERTENSION: Primary | ICD-10-CM

## 2018-11-29 NOTE — TELEPHONE ENCOUNTER
Luci Martinez from central scheduling called in regards to antonio order for echo stress he states he cant walk on treadmill and gavin asked if a new order can be placed with an alternative

## 2018-11-30 ENCOUNTER — MED REC SCAN ONLY (OUTPATIENT)
Dept: FAMILY MEDICINE CLINIC | Facility: CLINIC | Age: 53
End: 2018-11-30

## 2018-12-19 ENCOUNTER — MED REC SCAN ONLY (OUTPATIENT)
Dept: FAMILY MEDICINE CLINIC | Facility: CLINIC | Age: 53
End: 2018-12-19

## 2018-12-19 ENCOUNTER — OFFICE VISIT (OUTPATIENT)
Dept: INTERNAL MEDICINE CLINIC | Facility: CLINIC | Age: 53
End: 2018-12-19
Payer: COMMERCIAL

## 2018-12-19 VITALS
DIASTOLIC BLOOD PRESSURE: 82 MMHG | BODY MASS INDEX: 38.65 KG/M2 | HEART RATE: 82 BPM | RESPIRATION RATE: 16 BRPM | SYSTOLIC BLOOD PRESSURE: 120 MMHG | WEIGHT: 270 LBS | HEIGHT: 70 IN

## 2018-12-19 DIAGNOSIS — Z51.81 THERAPEUTIC DRUG MONITORING: Primary | ICD-10-CM

## 2018-12-19 DIAGNOSIS — E66.01 MORBID OBESITY (HCC): ICD-10-CM

## 2018-12-19 PROBLEM — M16.11 PRIMARY OSTEOARTHRITIS OF RIGHT HIP: Status: ACTIVE | Noted: 2018-12-19

## 2018-12-19 PROCEDURE — 99213 OFFICE O/P EST LOW 20 MIN: CPT | Performed by: INTERNAL MEDICINE

## 2018-12-19 RX ORDER — TOPIRAMATE 25 MG/1
25 TABLET ORAL 2 TIMES DAILY
Qty: 60 TABLET | Refills: 0 | Status: SHIPPED | OUTPATIENT
Start: 2018-12-19 | End: 2019-03-07

## 2018-12-19 NOTE — PROGRESS NOTES
HISTORY OF PRESENT ILLNESS  Patient presents with:  Weight Check: down 8 lbs      Flora Herman is a 48year old male here for follow up in medical weight loss program.     Denies chest pain, shortness of breath, dizziness, blurred vision, headache, pares 11/21/2014     Lab Results   Component Value Date     03/27/2018    A1C 5.9 (H) 03/27/2018     Lab Results   Component Value Date    CHOLEST 177 11/21/2014    HDL 72 11/21/2014    LDL 96 11/21/2014    VLDL 9 11/21/2014    NONHDLC 105 11/21/2014 dietitian and psychologist as recommended. · Discussed the role of sleep and stress in weight management. · Counseled on comprehensive weight loss plan including attention to nutrition, exercise and behavior/stress management for success.  See patient ins

## 2019-01-03 ENCOUNTER — HOSPITAL ENCOUNTER (OUTPATIENT)
Dept: CV DIAGNOSTICS | Facility: HOSPITAL | Age: 54
Discharge: HOME | End: 2019-01-03
Attending: INTERNAL MEDICINE
Payer: COMMERCIAL

## 2019-01-03 ENCOUNTER — HOSPITAL ENCOUNTER (OUTPATIENT)
Dept: CV DIAGNOSTICS | Age: 54
End: 2019-01-03
Attending: INTERNAL MEDICINE
Payer: COMMERCIAL

## 2019-01-03 DIAGNOSIS — M16.0 PRIMARY OSTEOARTHRITIS OF BOTH HIPS: ICD-10-CM

## 2019-01-03 DIAGNOSIS — I10 ESSENTIAL HYPERTENSION: ICD-10-CM

## 2019-01-03 DIAGNOSIS — R94.31 ABNORMAL ECG: ICD-10-CM

## 2019-01-03 DIAGNOSIS — G47.33 OSA (OBSTRUCTIVE SLEEP APNEA): ICD-10-CM

## 2019-01-03 DIAGNOSIS — Z51.81 THERAPEUTIC DRUG MONITORING: ICD-10-CM

## 2019-01-03 PROCEDURE — 93018 CV STRESS TEST I&R ONLY: CPT | Performed by: INTERNAL MEDICINE

## 2019-01-03 PROCEDURE — 93017 CV STRESS TEST TRACING ONLY: CPT | Performed by: INTERNAL MEDICINE

## 2019-01-03 PROCEDURE — 78452 HT MUSCLE IMAGE SPECT MULT: CPT | Performed by: INTERNAL MEDICINE

## 2019-01-07 ENCOUNTER — OFFICE VISIT (OUTPATIENT)
Dept: INTERNAL MEDICINE CLINIC | Facility: CLINIC | Age: 54
End: 2019-01-07
Payer: COMMERCIAL

## 2019-01-07 VITALS — WEIGHT: 266 LBS | BODY MASS INDEX: 38 KG/M2

## 2019-01-07 DIAGNOSIS — I10 ESSENTIAL HYPERTENSION: ICD-10-CM

## 2019-01-07 DIAGNOSIS — E66.9 OBESITY, CLASS II, BMI 35-39.9: Primary | ICD-10-CM

## 2019-01-07 PROCEDURE — 97803 MED NUTRITION INDIV SUBSEQ: CPT | Performed by: DIETITIAN, REGISTERED

## 2019-01-07 NOTE — PROGRESS NOTES
FOLLOW UP NUTRITION CONSULTATION    Nutrition Assessment    Number of consultations with dietitian: 5    Height:  Ht Readings from Last 1 Encounters:  12/19/18 : 70\"      Weight:   Wt Readings from Last 2 Encounters:  01/07/19 : 266 lb  12/19/18 : 270

## 2019-01-14 RX ORDER — MULTIVIT-MIN/FOLIC/VIT K/LYCOP 400-300MCG
1 TABLET ORAL DAILY
COMMUNITY

## 2019-01-14 RX ORDER — ACETAMINOPHEN 500 MG
1000 TABLET ORAL ONCE
Status: ON HOLD | COMMUNITY
End: 2019-02-10

## 2019-01-17 ENCOUNTER — HOSPITAL ENCOUNTER (OUTPATIENT)
Dept: GENERAL RADIOLOGY | Age: 54
Discharge: HOME | End: 2019-01-17
Attending: EMERGENCY MEDICINE
Payer: COMMERCIAL

## 2019-01-17 ENCOUNTER — LAB ENCOUNTER (OUTPATIENT)
Dept: LAB | Age: 54
End: 2019-01-17
Attending: EMERGENCY MEDICINE
Payer: COMMERCIAL

## 2019-01-17 ENCOUNTER — OFFICE VISIT (OUTPATIENT)
Dept: FAMILY MEDICINE CLINIC | Facility: CLINIC | Age: 54
End: 2019-01-17
Payer: COMMERCIAL

## 2019-01-17 VITALS
DIASTOLIC BLOOD PRESSURE: 84 MMHG | SYSTOLIC BLOOD PRESSURE: 116 MMHG | BODY MASS INDEX: 37.51 KG/M2 | OXYGEN SATURATION: 98 % | HEIGHT: 70 IN | HEART RATE: 101 BPM | WEIGHT: 262 LBS | RESPIRATION RATE: 17 BRPM

## 2019-01-17 DIAGNOSIS — Z13.29 SCREENING FOR ENDOCRINE, NUTRITIONAL, METABOLIC AND IMMUNITY DISORDER: ICD-10-CM

## 2019-01-17 DIAGNOSIS — Z01.818 PRE-OP EXAM: ICD-10-CM

## 2019-01-17 DIAGNOSIS — Z01.818 PRE-OP EXAM: Primary | ICD-10-CM

## 2019-01-17 DIAGNOSIS — R93.1 DECREASED CARDIAC EJECTION FRACTION: ICD-10-CM

## 2019-01-17 DIAGNOSIS — M16.12 OSTEOARTHRITIS OF LEFT HIP: ICD-10-CM

## 2019-01-17 DIAGNOSIS — E66.9 OBESITY (BMI 30-39.9): ICD-10-CM

## 2019-01-17 DIAGNOSIS — Z13.21 SCREENING FOR ENDOCRINE, NUTRITIONAL, METABOLIC AND IMMUNITY DISORDER: ICD-10-CM

## 2019-01-17 DIAGNOSIS — E55.9 VITAMIN D DEFICIENCY: ICD-10-CM

## 2019-01-17 DIAGNOSIS — Z13.0 SCREENING FOR ENDOCRINE, NUTRITIONAL, METABOLIC AND IMMUNITY DISORDER: ICD-10-CM

## 2019-01-17 DIAGNOSIS — Z13.228 SCREENING FOR ENDOCRINE, NUTRITIONAL, METABOLIC AND IMMUNITY DISORDER: ICD-10-CM

## 2019-01-17 LAB
ALBUMIN SERPL-MCNC: 4.1 G/DL (ref 3.1–4.5)
ALBUMIN/GLOB SERPL: 1.2 {RATIO} (ref 1–2)
ALP LIVER SERPL-CCNC: 30 U/L (ref 45–117)
ALT SERPL-CCNC: 45 U/L (ref 17–63)
ANION GAP SERPL CALC-SCNC: 9 MMOL/L (ref 0–18)
ANTIBODY SCREEN: NEGATIVE
AST SERPL-CCNC: 26 U/L (ref 15–41)
BASOPHILS # BLD AUTO: 0.03 X10(3) UL (ref 0–0.1)
BASOPHILS NFR BLD AUTO: 0.6 %
BILIRUB SERPL-MCNC: 0.8 MG/DL (ref 0.1–2)
BUN BLD-MCNC: 27 MG/DL (ref 8–20)
BUN/CREAT SERPL: 27.8 (ref 10–20)
CALCIUM BLD-MCNC: 8.8 MG/DL (ref 8.3–10.3)
CHLORIDE SERPL-SCNC: 107 MMOL/L (ref 101–111)
CHOLEST SMN-MCNC: 187 MG/DL (ref ?–200)
CO2 SERPL-SCNC: 25 MMOL/L (ref 22–32)
CREAT BLD-MCNC: 0.97 MG/DL (ref 0.7–1.3)
EOSINOPHIL # BLD AUTO: 0.11 X10(3) UL (ref 0–0.3)
EOSINOPHIL NFR BLD AUTO: 2 %
ERYTHROCYTE [DISTWIDTH] IN BLOOD BY AUTOMATED COUNT: 14 % (ref 11.5–16)
GLOBULIN PLAS-MCNC: 3.5 G/DL (ref 2.8–4.4)
GLUCOSE BLD-MCNC: 76 MG/DL (ref 70–99)
HCT VFR BLD AUTO: 42.4 % (ref 37–53)
HDLC SERPL-MCNC: 63 MG/DL (ref 40–59)
HGB BLD-MCNC: 13.7 G/DL (ref 13–17)
IMMATURE GRANULOCYTE COUNT: 0.01 X10(3) UL (ref 0–1)
IMMATURE GRANULOCYTE RATIO %: 0.2 %
LDLC SERPL CALC-MCNC: 116 MG/DL (ref ?–100)
LYMPHOCYTES # BLD AUTO: 2.02 X10(3) UL (ref 0.9–4)
LYMPHOCYTES NFR BLD AUTO: 37.4 %
M PROTEIN MFR SERPL ELPH: 7.6 G/DL (ref 6.4–8.2)
MCH RBC QN AUTO: 31.1 PG (ref 27–33.2)
MCHC RBC AUTO-ENTMCNC: 32.3 G/DL (ref 31–37)
MCV RBC AUTO: 96.1 FL (ref 80–99)
MONOCYTES # BLD AUTO: 0.5 X10(3) UL (ref 0.1–1)
MONOCYTES NFR BLD AUTO: 9.3 %
NEUTROPHIL ABS PRELIM: 2.73 X10 (3) UL (ref 1.3–6.7)
NEUTROPHILS # BLD AUTO: 2.73 X10(3) UL (ref 1.3–6.7)
NEUTROPHILS NFR BLD AUTO: 50.5 %
NONHDLC SERPL-MCNC: 124 MG/DL (ref ?–130)
OSMOLALITY SERPL CALC.SUM OF ELEC: 296 MOSM/KG (ref 275–295)
PLATELET # BLD AUTO: 299 10(3)UL (ref 150–450)
POTASSIUM SERPL-SCNC: 3.7 MMOL/L (ref 3.6–5.1)
RBC # BLD AUTO: 4.41 X10(6)UL (ref 4.3–5.7)
RED CELL DISTRIBUTION WIDTH-SD: 50 FL (ref 35.1–46.3)
RH BLOOD TYPE: POSITIVE
SODIUM SERPL-SCNC: 141 MMOL/L (ref 136–144)
TRIGL SERPL-MCNC: 42 MG/DL (ref 30–149)
VIT D+METAB SERPL-MCNC: 27.3 NG/ML (ref 30–100)
VLDLC SERPL CALC-MCNC: 8 MG/DL (ref 0–30)
WBC # BLD AUTO: 5.4 X10(3) UL (ref 4–13)

## 2019-01-17 PROCEDURE — 99244 OFF/OP CNSLTJ NEW/EST MOD 40: CPT | Performed by: EMERGENCY MEDICINE

## 2019-01-17 PROCEDURE — 36415 COLL VENOUS BLD VENIPUNCTURE: CPT | Performed by: EMERGENCY MEDICINE

## 2019-01-17 PROCEDURE — 87081 CULTURE SCREEN ONLY: CPT | Performed by: EMERGENCY MEDICINE

## 2019-01-17 PROCEDURE — 80053 COMPREHEN METABOLIC PANEL: CPT | Performed by: EMERGENCY MEDICINE

## 2019-01-17 PROCEDURE — 85025 COMPLETE CBC W/AUTO DIFF WBC: CPT | Performed by: EMERGENCY MEDICINE

## 2019-01-17 PROCEDURE — 82306 VITAMIN D 25 HYDROXY: CPT | Performed by: EMERGENCY MEDICINE

## 2019-01-17 PROCEDURE — 71046 X-RAY EXAM CHEST 2 VIEWS: CPT | Performed by: EMERGENCY MEDICINE

## 2019-01-17 PROCEDURE — 93000 ELECTROCARDIOGRAM COMPLETE: CPT | Performed by: EMERGENCY MEDICINE

## 2019-01-17 PROCEDURE — 80061 LIPID PANEL: CPT | Performed by: EMERGENCY MEDICINE

## 2019-01-17 NOTE — PROGRESS NOTES
Jen Rodríguez is a 48year old male who presents for a pre-operative physical exam.   HPI related to surgery:   Jen Rodríguez is scheduled for a left hip replacement procedure at [address] on Feb 7  to be performed by Dr. Yunior Tomlinson    Indication: hi throat  RESPIRATORY:no shortness of breath, wheezing or cough   CARDIOVASCULAR: denies chest pain, abnormal movement of the chest.  GI: no nausea, vomiting, constipation, diarrhea;   GENITAL/: no dysuria, urgency or frequency  MUSCULOSKELETAL: no joint p -American 104 >=60    AST 26 15 - 41 U/L    Alt 45 17 - 63 U/L    Alkaline Phosphatase 30 (L) 45 - 117 U/L    Bilirubin, Total 0.8 0.1 - 2.0 mg/dL    Total Protein 7.6 6.4 - 8.2 g/dL    Albumin 4.1 3.1 - 4.5 g/dL    Globulin  3.5 2.8 - 4.4 g/dL    A changes noted. ASSESSMENT AND PLAN:       Pre-op exam    Perri Lr has no significant history of cardiac or pulmonary conditions. Patient has a history of hypertension which is well controlled.   Recent workup with nuclear stress testing showed

## 2019-01-17 NOTE — PATIENT INSTRUCTIONS
Thank you for choosing [facility]  To Do:  FOR [address]    · Continue with weight loss  · Arrange for ultrasound of heart  · have blood tests done  · Chest Xray today  · Follow up in 2-3 months  ·

## 2019-01-18 DIAGNOSIS — E55.9 VITAMIN D DEFICIENCY: Primary | ICD-10-CM

## 2019-01-23 ENCOUNTER — HOSPITAL ENCOUNTER (OUTPATIENT)
Dept: CV DIAGNOSTICS | Age: 54
Discharge: HOME | End: 2019-01-23
Attending: EMERGENCY MEDICINE
Payer: COMMERCIAL

## 2019-01-23 ENCOUNTER — TELEPHONE (OUTPATIENT)
Dept: FAMILY MEDICINE CLINIC | Facility: CLINIC | Age: 54
End: 2019-01-23

## 2019-01-23 DIAGNOSIS — R93.1 DECREASED CARDIAC EJECTION FRACTION: ICD-10-CM

## 2019-01-23 PROCEDURE — 93306 TTE W/DOPPLER COMPLETE: CPT | Performed by: EMERGENCY MEDICINE

## 2019-01-23 NOTE — TELEPHONE ENCOUNTER
LM for patient to  Call back regarding a nasal swab he needs treatment for.      PSR  Ok to link triage

## 2019-01-23 NOTE — TELEPHONE ENCOUNTER
----- Message from Jeovanny Ivan MD sent at 1/21/2019  4:13 PM CST -----  + staph, but not MRSA  pls rx bactroban to both nostrils BID x 5 d

## 2019-01-24 NOTE — TELEPHONE ENCOUNTER
Patient aware of test results. He already picked up the prescription from the pharmacy and was instructed on how to use this. He had no questions regarding the antibiotic. I instructed him to complete 5 full days of treatment. Verbalized understanding.

## 2019-01-28 ENCOUNTER — APPOINTMENT (OUTPATIENT)
Dept: LAB | Facility: HOSPITAL | Age: 54
End: 2019-01-28
Attending: ORTHOPAEDIC SURGERY
Payer: COMMERCIAL

## 2019-01-28 ENCOUNTER — HOSPITAL ENCOUNTER (OUTPATIENT)
Dept: PHYSICAL THERAPY | Facility: HOSPITAL | Age: 54
Discharge: HOME | End: 2019-01-28
Attending: ORTHOPAEDIC SURGERY
Payer: COMMERCIAL

## 2019-01-28 DIAGNOSIS — M16.12 OSTEOARTHRITIS OF LEFT HIP: ICD-10-CM

## 2019-01-29 ENCOUNTER — TELEPHONE (OUTPATIENT)
Dept: FAMILY MEDICINE CLINIC | Facility: CLINIC | Age: 54
End: 2019-01-29

## 2019-01-29 NOTE — TELEPHONE ENCOUNTER
Pre op exam, labs, EKG and chest Xray were faxed to [address] pre testing at [phone]. Fax confirmation was received.

## 2019-01-30 PROBLEM — M16.12 PRIMARY OSTEOARTHRITIS OF LEFT HIP: Status: ACTIVE | Noted: 2019-01-30

## 2019-02-07 ENCOUNTER — HOSPITAL ENCOUNTER (INPATIENT)
Facility: HOSPITAL | Age: 54
LOS: 4 days | Discharge: NURSING, REHAB OR LONG-TERM CARE | DRG: 470 | End: 2019-02-11
Attending: ORTHOPAEDIC SURGERY | Admitting: ORTHOPAEDIC SURGERY
Payer: COMMERCIAL

## 2019-02-07 ENCOUNTER — ANESTHESIA (OUTPATIENT)
Dept: SURGERY | Facility: HOSPITAL | Age: 54
DRG: 470 | End: 2019-02-07
Payer: COMMERCIAL

## 2019-02-07 ENCOUNTER — APPOINTMENT (OUTPATIENT)
Dept: GENERAL RADIOLOGY | Facility: HOSPITAL | Age: 54
DRG: 470 | End: 2019-02-07
Attending: PHYSICIAN ASSISTANT
Payer: COMMERCIAL

## 2019-02-07 ENCOUNTER — ANESTHESIA EVENT (OUTPATIENT)
Dept: SURGERY | Facility: HOSPITAL | Age: 54
DRG: 470 | End: 2019-02-07
Payer: COMMERCIAL

## 2019-02-07 DIAGNOSIS — M16.12 PRIMARY OSTEOARTHRITIS OF LEFT HIP: ICD-10-CM

## 2019-02-07 DIAGNOSIS — M16.12 OSTEOARTHRITIS OF LEFT HIP: Primary | ICD-10-CM

## 2019-02-07 LAB — CREAT BLD-MCNC: 0.64 MG/DL (ref 0.7–1.3)

## 2019-02-07 PROCEDURE — 73501 X-RAY EXAM HIP UNI 1 VIEW: CPT | Performed by: PHYSICIAN ASSISTANT

## 2019-02-07 PROCEDURE — 99253 IP/OBS CNSLTJ NEW/EST LOW 45: CPT | Performed by: HOSPITALIST

## 2019-02-07 PROCEDURE — 0SRB04A REPLACEMENT OF LEFT HIP JOINT WITH CERAMIC ON POLYETHYLENE SYNTHETIC SUBSTITUTE, UNCEMENTED, OPEN APPROACH: ICD-10-PCS | Performed by: ORTHOPAEDIC SURGERY

## 2019-02-07 PROCEDURE — 3E0T3BZ INTRODUCTION OF ANESTHETIC AGENT INTO PERIPHERAL NERVES AND PLEXI, PERCUTANEOUS APPROACH: ICD-10-PCS | Performed by: ANESTHESIOLOGY

## 2019-02-07 DEVICE — PINNACLE 100 ACETABULAR SHELL GRIPTION 100 58MM OD
Type: IMPLANTABLE DEVICE | Site: HIP | Status: FUNCTIONAL
Brand: PINNACLE GRIPTION

## 2019-02-07 DEVICE — APEX HOLE ELIMINATOR - PS
Type: IMPLANTABLE DEVICE | Site: HIP | Status: FUNCTIONAL
Brand: APEX

## 2019-02-07 DEVICE — CORAIL HIP SYSTEM CEMENTLESS FEMORAL STEM 12/14 AMT 135 DEGREES KS SIZE 13 HA COATED STANDARD NO COLLAR
Type: IMPLANTABLE DEVICE | Site: HIP | Status: FUNCTIONAL
Brand: CORAIL

## 2019-02-07 DEVICE — BIOLOX DELTA CERAMIC FEMORAL HEAD +1.5 36MM DIA 12/14 TAPER
Type: IMPLANTABLE DEVICE | Site: HIP | Status: FUNCTIONAL
Brand: BIOLOX DELTA

## 2019-02-07 DEVICE — PINNACLE HIP SOLUTIONS ALTRX POLYETHYLENE ACETABULAR LINER +4 NEUTRAL 36MM ID 58MM OD
Type: IMPLANTABLE DEVICE | Site: HIP | Status: FUNCTIONAL
Brand: PINNACLE ALTRX

## 2019-02-07 RX ORDER — ACETAMINOPHEN 325 MG/1
650 TABLET ORAL 4 TIMES DAILY
Status: DISPENSED | OUTPATIENT
Start: 2019-02-07 | End: 2019-02-09

## 2019-02-07 RX ORDER — HYDROMORPHONE HYDROCHLORIDE 1 MG/ML
0.4 INJECTION, SOLUTION INTRAMUSCULAR; INTRAVENOUS; SUBCUTANEOUS EVERY 2 HOUR PRN
Status: ACTIVE | OUTPATIENT
Start: 2019-02-07 | End: 2019-02-09

## 2019-02-07 RX ORDER — DIPHENHYDRAMINE HYDROCHLORIDE 50 MG/ML
25 INJECTION INTRAMUSCULAR; INTRAVENOUS ONCE AS NEEDED
Status: ACTIVE | OUTPATIENT
Start: 2019-02-07 | End: 2019-02-07

## 2019-02-07 RX ORDER — DEXAMETHASONE SODIUM PHOSPHATE 4 MG/ML
4 VIAL (ML) INJECTION AS NEEDED
Status: DISCONTINUED | OUTPATIENT
Start: 2019-02-07 | End: 2019-02-07 | Stop reason: HOSPADM

## 2019-02-07 RX ORDER — ACETAMINOPHEN 325 MG/1
TABLET ORAL
Status: COMPLETED
Start: 2019-02-07 | End: 2019-02-07

## 2019-02-07 RX ORDER — ONDANSETRON 2 MG/ML
4 INJECTION INTRAMUSCULAR; INTRAVENOUS EVERY 4 HOURS PRN
Status: DISPENSED | OUTPATIENT
Start: 2019-02-07 | End: 2019-02-09

## 2019-02-07 RX ORDER — POLYETHYLENE GLYCOL 3350 17 G/17G
17 POWDER, FOR SOLUTION ORAL DAILY PRN
Status: DISCONTINUED | OUTPATIENT
Start: 2019-02-07 | End: 2019-02-11

## 2019-02-07 RX ORDER — MELOXICAM 15 MG/1
15 TABLET ORAL
Status: ON HOLD | COMMUNITY
End: 2019-02-10

## 2019-02-07 RX ORDER — OXYCODONE HYDROCHLORIDE 15 MG/1
15 TABLET ORAL EVERY 4 HOURS PRN
Status: DISPENSED | OUTPATIENT
Start: 2019-02-07 | End: 2019-02-09

## 2019-02-07 RX ORDER — HYDROMORPHONE HYDROCHLORIDE 1 MG/ML
0.2 INJECTION, SOLUTION INTRAMUSCULAR; INTRAVENOUS; SUBCUTANEOUS EVERY 2 HOUR PRN
Status: ACTIVE | OUTPATIENT
Start: 2019-02-07 | End: 2019-02-09

## 2019-02-07 RX ORDER — NALOXONE HYDROCHLORIDE 0.4 MG/ML
80 INJECTION, SOLUTION INTRAMUSCULAR; INTRAVENOUS; SUBCUTANEOUS AS NEEDED
Status: DISCONTINUED | OUTPATIENT
Start: 2019-02-07 | End: 2019-02-07 | Stop reason: HOSPADM

## 2019-02-07 RX ORDER — ZOLPIDEM TARTRATE 5 MG/1
5 TABLET ORAL NIGHTLY PRN
Status: DISCONTINUED | OUTPATIENT
Start: 2019-02-07 | End: 2019-02-11

## 2019-02-07 RX ORDER — LISINOPRIL 10 MG/1
10 TABLET ORAL DAILY
Status: ON HOLD | COMMUNITY
End: 2019-02-10

## 2019-02-07 RX ORDER — ONDANSETRON 2 MG/ML
4 INJECTION INTRAMUSCULAR; INTRAVENOUS AS NEEDED
Status: DISCONTINUED | OUTPATIENT
Start: 2019-02-07 | End: 2019-02-07 | Stop reason: HOSPADM

## 2019-02-07 RX ORDER — OXYCODONE HYDROCHLORIDE 5 MG/1
5 TABLET ORAL EVERY 4 HOURS PRN
Status: DISPENSED | OUTPATIENT
Start: 2019-02-07 | End: 2019-02-09

## 2019-02-07 RX ORDER — BISACODYL 10 MG
10 SUPPOSITORY, RECTAL RECTAL
Status: DISCONTINUED | OUTPATIENT
Start: 2019-02-07 | End: 2019-02-11

## 2019-02-07 RX ORDER — ACETAMINOPHEN 500 MG
1000 TABLET ORAL ONCE
Status: DISCONTINUED | OUTPATIENT
Start: 2019-02-07 | End: 2019-02-07

## 2019-02-07 RX ORDER — SODIUM CHLORIDE, SODIUM LACTATE, POTASSIUM CHLORIDE, CALCIUM CHLORIDE 600; 310; 30; 20 MG/100ML; MG/100ML; MG/100ML; MG/100ML
INJECTION, SOLUTION INTRAVENOUS CONTINUOUS
Status: DISCONTINUED | OUTPATIENT
Start: 2019-02-07 | End: 2019-02-07 | Stop reason: HOSPADM

## 2019-02-07 RX ORDER — DOCUSATE SODIUM 100 MG/1
100 CAPSULE, LIQUID FILLED ORAL 2 TIMES DAILY
Status: DISCONTINUED | OUTPATIENT
Start: 2019-02-07 | End: 2019-02-11

## 2019-02-07 RX ORDER — SCOLOPAMINE TRANSDERMAL SYSTEM 1 MG/1
1 PATCH, EXTENDED RELEASE TRANSDERMAL ONCE
Status: COMPLETED | OUTPATIENT
Start: 2019-02-07 | End: 2019-02-10

## 2019-02-07 RX ORDER — DIPHENHYDRAMINE HYDROCHLORIDE 50 MG/ML
12.5 INJECTION INTRAMUSCULAR; INTRAVENOUS EVERY 4 HOURS PRN
Status: DISCONTINUED | OUTPATIENT
Start: 2019-02-07 | End: 2019-02-11

## 2019-02-07 RX ORDER — KETOROLAC TROMETHAMINE 30 MG/ML
30 INJECTION, SOLUTION INTRAMUSCULAR; INTRAVENOUS EVERY 6 HOURS
Status: COMPLETED | OUTPATIENT
Start: 2019-02-07 | End: 2019-02-08

## 2019-02-07 RX ORDER — CEFAZOLIN SODIUM/WATER 2 G/20 ML
2 SYRINGE (ML) INTRAVENOUS EVERY 8 HOURS
Status: COMPLETED | OUTPATIENT
Start: 2019-02-07 | End: 2019-02-07

## 2019-02-07 RX ORDER — MIDAZOLAM HYDROCHLORIDE 1 MG/ML
1 INJECTION INTRAMUSCULAR; INTRAVENOUS EVERY 5 MIN PRN
Status: DISCONTINUED | OUTPATIENT
Start: 2019-02-07 | End: 2019-02-07 | Stop reason: HOSPADM

## 2019-02-07 RX ORDER — SODIUM CHLORIDE 9 MG/ML
INJECTION, SOLUTION INTRAVENOUS CONTINUOUS
Status: DISCONTINUED | OUTPATIENT
Start: 2019-02-07 | End: 2019-02-11

## 2019-02-07 RX ORDER — HYDROMORPHONE HYDROCHLORIDE 1 MG/ML
0.8 INJECTION, SOLUTION INTRAMUSCULAR; INTRAVENOUS; SUBCUTANEOUS EVERY 2 HOUR PRN
Status: DISPENSED | OUTPATIENT
Start: 2019-02-07 | End: 2019-02-09

## 2019-02-07 RX ORDER — MELATONIN
325
Status: DISCONTINUED | OUTPATIENT
Start: 2019-02-07 | End: 2019-02-11

## 2019-02-07 RX ORDER — DIPHENHYDRAMINE HCL 25 MG
25 CAPSULE ORAL EVERY 4 HOURS PRN
Status: DISCONTINUED | OUTPATIENT
Start: 2019-02-07 | End: 2019-02-11

## 2019-02-07 RX ORDER — METOCLOPRAMIDE HYDROCHLORIDE 5 MG/ML
10 INJECTION INTRAMUSCULAR; INTRAVENOUS EVERY 6 HOURS PRN
Status: ACTIVE | OUTPATIENT
Start: 2019-02-07 | End: 2019-02-09

## 2019-02-07 RX ORDER — SODIUM CHLORIDE, SODIUM LACTATE, POTASSIUM CHLORIDE, CALCIUM CHLORIDE 600; 310; 30; 20 MG/100ML; MG/100ML; MG/100ML; MG/100ML
INJECTION, SOLUTION INTRAVENOUS CONTINUOUS
Status: DISCONTINUED | OUTPATIENT
Start: 2019-02-07 | End: 2019-02-11

## 2019-02-07 RX ORDER — ACETAMINOPHEN 325 MG/1
650 TABLET ORAL ONCE
Status: COMPLETED | OUTPATIENT
Start: 2019-02-07 | End: 2019-02-07

## 2019-02-07 RX ORDER — MEPERIDINE HYDROCHLORIDE 25 MG/ML
12.5 INJECTION INTRAMUSCULAR; INTRAVENOUS; SUBCUTANEOUS AS NEEDED
Status: DISCONTINUED | OUTPATIENT
Start: 2019-02-07 | End: 2019-02-07 | Stop reason: HOSPADM

## 2019-02-07 RX ORDER — TIZANIDINE 4 MG/1
4 TABLET ORAL 3 TIMES DAILY PRN
Status: DISCONTINUED | OUTPATIENT
Start: 2019-02-07 | End: 2019-02-08

## 2019-02-07 RX ORDER — HYDROMORPHONE HYDROCHLORIDE 1 MG/ML
0.4 INJECTION, SOLUTION INTRAMUSCULAR; INTRAVENOUS; SUBCUTANEOUS EVERY 5 MIN PRN
Status: DISCONTINUED | OUTPATIENT
Start: 2019-02-07 | End: 2019-02-07 | Stop reason: HOSPADM

## 2019-02-07 RX ORDER — SODIUM PHOSPHATE, DIBASIC AND SODIUM PHOSPHATE, MONOBASIC 7; 19 G/133ML; G/133ML
1 ENEMA RECTAL ONCE AS NEEDED
Status: DISCONTINUED | OUTPATIENT
Start: 2019-02-07 | End: 2019-02-11

## 2019-02-07 RX ORDER — OXYCODONE HYDROCHLORIDE 10 MG/1
10 TABLET ORAL EVERY 4 HOURS PRN
Status: DISPENSED | OUTPATIENT
Start: 2019-02-07 | End: 2019-02-09

## 2019-02-07 RX ORDER — HYDROMORPHONE HYDROCHLORIDE 1 MG/ML
INJECTION, SOLUTION INTRAMUSCULAR; INTRAVENOUS; SUBCUTANEOUS
Status: DISCONTINUED
Start: 2019-02-07 | End: 2019-02-07 | Stop reason: WASHOUT

## 2019-02-07 NOTE — H&P
You Ross   1/17/2019 9:20 AM   Office Visit   MRN:  [id number]   Description: 48year old male Provider: Nahid Paez MD [facility]   Scanning Cover Sheet     Click to print [address] for scanning   Office V topiramate (TOPAMAX) 25 MG Oral Tab Take 1 tablet (25 mg total) by mouth 2 (two) times daily.  Disp: 60 tablet Rfl: 0   lisinopril 10 MG Oral Tab TAKE 1 TABLET(10 MG) BY MOUTH DAILY Disp: 90 tablet Rfl: 0   TraMADol HCl 50 MG Oral Tab 1 tab po q 6 hrs prn p Integument: No lesions. No erythema.   Psychiatric: Appropriate mood and affect.     LABORATORY DATA:            Recent Results   Recent Results (from the past 336 hour(s))   COMP METABOLIC PANEL (14)     Collection Time: 01/17/19 10:38 AM   Result Value Re   MCH 31.1 27.0 - 33.2 pg     MCHC 32.3 31.0 - 37.0 g/dL     RDW 14.0 11.5 - 16.0 %     RDW-SD 50.0 (H) 35.1 - 46.3 fL     Neutrophil Absolute Prelim 2.73 1.30 - 6.70 x10 (3) uL     Neutrophil Absolute 2.73 1.30 - 6.70 x10(3) uL     Lymphocyte Absolute 2. 0 Lorna Cole MD

## 2019-02-07 NOTE — PAYOR COMM NOTE
--------------  ADMISSION REVIEW     Payor: Lesley Mark S #:  [id number]  Authorization Number: [id number]    Admit date: 2/7/19  Admit time: [facility]       Admitting Physician: Luke Garvin MD  Attending Physician:  Luke Garvin MD  Primary (DILAUDID) 1 MG/ML injection 0.8 mg     Date Action Dose Route User    2/7/2019 1044 Given 0.8 mg Intravenous Chelo Cope RN      lactated ringers infusion     Date Action Dose Route User    2/7/2019 9379 New Bag (none) Intravenous Claudeen Hunger, [place] room and placed in the supine position after administration of spinal anesthesia.  A regional block was performed at the lower extremity.  The patient was turned to the lateral decubitus position where He was positioned and padded appropriately.  A time-ou fixation and good position.  The +4 trial polyethylene liner was placed.  Attention was turned to the femur.  The cookie cutter was used to gain lateral access to the canal.  The canal finder was used followed by the broaches up to size 13, which found to no complications.  Blood loss was approximately 250 ml.  The patient was taken to Recovery in stable condition.                   Electronically signed by Tamara Nichole MD at 2/7/2019  8:56 AM

## 2019-02-07 NOTE — OPERATIVE REPORT
PATIENT'S NAME: Tushar Alejandro   ATTENDING PHYSICIAN: Rosalio Monzon MD   OPERATING PHYSICIAN: Rosalio Monzon MD   PATIENT ACCOUNT#:   [de-identified]    LOCATION:  [address] [place] [address],[address] RECORD #:   [id number]    YOB: 1965  [facility] down in line with the vastus lateralis. The tendon was subperiosteally elevated off the anterior aspect of the greater trochanter down to  capsule, which was incised in a T-type fashion, partially excising the capsule to gain access to the joint.   Aft Almazan taper, the final head was impacted in place with good fixation. The final reduction was performed and the hip was put through good range of motion with good leg length and offset and no instability. The pin was removed from the pelvis.   Irrisept ir

## 2019-02-07 NOTE — INTERVAL H&P NOTE
Pre-op Diagnosis: Primary osteoarthritis of left hip [M16.12]    The above referenced H&P was reviewed by Ryder Rock MD on 2/7/2019, the patient was examined and no significant changes have occurred in the patient's condition since the H&P was perfor

## 2019-02-07 NOTE — ANESTHESIA PREPROCEDURE EVALUATION
PRE-OP EVALUATION    Patient Name: Fahad Higuera    Pre-op Diagnosis: Primary osteoarthritis of left hip [M16.12]    Procedure(s):  LEFT TOTAL HIP ARTHROPLASTY    Surgeon(s) and Role:     Jeffory Severs, MD - Primary    Pre-op vitals reviewed.   Temp: hypertension                                     Endo/Other    Negative endo/other ROS. Pulmonary                    (+) sleep apnea and CPAP      Neuro/Psych    Negative neuro/psych ROS.                                 Past Kaz

## 2019-02-07 NOTE — CM/SW NOTE
Call from Santy santizo with [facility] [phone]. She states that pt is accepted- facility will submit for insurance auth.

## 2019-02-07 NOTE — CONSULTS
[facility]  [address] Patient Status:  Inpatient    1965 MRN [id number]   [facility] 2SW-S Attending Deo Beebe MD   Hosp Day # 0 PCP Jamie Arrieta MD     Reason for consult: medical management Multiple Vitamin (ONE-A-DAY MENS) Oral Tab Take 1 tablet by mouth daily. Disp:  Rfl:    acetaminophen 500 MG Oral Tab Take 1,000 mg by mouth one time.    Disp:  Rfl:    TraMADol HCl 50 MG Oral Tab 1 tab po q 6 hrs prn pain Disp: 120 tablet Rfl: 5   [DIS in the last 168 hours. Imaging: Imaging data reviewed in Epic. ASSESSMENT / PLAN:     1. Essential hypertension  1. Hold lisinopril  2. monitor  2. Morbid obesity  1. BMI 38.74  3. Obstructive sleep apnea  4.  Osteoarthritis status post left hip art

## 2019-02-07 NOTE — ANESTHESIA POSTPROCEDURE EVALUATION
Nura Murphy 80 Patient Status:  Inpatient   Age/Gender 48year old male MRN [id number]   [facility] 2SW-S Attending Deo Beebe MD   Hosp Day # 0 PCP Jamie Arrieta MD       Anesthesia Post-op Note    Procedure(s):

## 2019-02-07 NOTE — PLAN OF CARE
Received patient from [facility]. Total left hip replacement. Vitals stable. Complains of pain 7/10. Prn dilaudid and oxycodone given with some relief. Iv fluids infusing. Patient and family updated with plan of care and all questions answered.  Will continue to

## 2019-02-07 NOTE — PHYSICAL THERAPY NOTE
PHYSICAL THERAPY HIP EVALUATION - INPATIENT     Room Number: 1588/2413-I  Evaluation Date: 2/7/2019  Type of Evaluation: Initial  Physician Order: PT Eval and Treat    Presenting Problem: S/p Left SYED on 02/07/19  Reason for Therapy: Mobility Dysfunction a mechanics;Breathing techniques;Relaxation;Repositioning    COGNITION  · Overall Cognitive Status:  WFL - within functional limits    RANGE OF MOTION AND STRENGTH ASSESSMENT  Upper extremity ROM and strength are within functional limits     Lower extremity Provided: Evaluation completed. Patient was instructed & educated in post surgical precautions & weight bearing status. Issued handouts for precautions & reviewed multiple times during this session.  Patient was able to participate with Left LE exercises as p from skilled inpatient PT to address the above deficits to assist patient in returning to prior to level of function.   DISCHARGE RECOMMENDATIONS  PT Discharge Recommendations: Sub-acute rehabilitationThe AM-PAC '6-Clicks' Inpatient Basic Mobility Short For

## 2019-02-08 ENCOUNTER — TELEPHONE (OUTPATIENT)
Dept: INTERNAL MEDICINE CLINIC | Facility: CLINIC | Age: 54
End: 2019-02-08

## 2019-02-08 PROBLEM — Z47.89 ORTHOPEDIC AFTERCARE: Status: ACTIVE | Noted: 2019-02-08

## 2019-02-08 PROBLEM — M16.12 OSTEOARTHRITIS OF LEFT HIP: Status: ACTIVE | Noted: 2019-01-30

## 2019-02-08 LAB
ANION GAP SERPL CALC-SCNC: 7 MMOL/L (ref 0–18)
BUN BLD-MCNC: 13 MG/DL (ref 8–20)
BUN/CREAT SERPL: 16.7 (ref 10–20)
CALCIUM BLD-MCNC: 8.1 MG/DL (ref 8.3–10.3)
CHLORIDE SERPL-SCNC: 105 MMOL/L (ref 101–111)
CO2 SERPL-SCNC: 29 MMOL/L (ref 22–32)
CREAT BLD-MCNC: 0.78 MG/DL (ref 0.7–1.3)
DEPRECATED RDW RBC AUTO: 47.8 FL (ref 35.1–46.3)
ERYTHROCYTE [DISTWIDTH] IN BLOOD BY AUTOMATED COUNT: 13.5 % (ref 11–15)
GLUCOSE BLD-MCNC: 98 MG/DL (ref 70–99)
HCT VFR BLD AUTO: 32.9 % (ref 39–53)
HGB BLD-MCNC: 10.9 G/DL (ref 13–17.5)
MCH RBC QN AUTO: 31.9 PG (ref 26–34)
MCHC RBC AUTO-ENTMCNC: 33.1 G/DL (ref 31–37)
MCV RBC AUTO: 96.2 FL (ref 80–100)
OSMOLALITY SERPL CALC.SUM OF ELEC: 292 MOSM/KG (ref 275–295)
PLATELET # BLD AUTO: 216 10(3)UL (ref 150–450)
POTASSIUM SERPL-SCNC: 3.9 MMOL/L (ref 3.6–5.1)
RBC # BLD AUTO: 3.42 X10(6)UL (ref 4.3–5.7)
SODIUM SERPL-SCNC: 141 MMOL/L (ref 136–144)
WBC # BLD AUTO: 8.2 X10(3) UL (ref 4–11)

## 2019-02-08 PROCEDURE — 99231 SBSQ HOSP IP/OBS SF/LOW 25: CPT | Performed by: HOSPITALIST

## 2019-02-08 RX ORDER — ACETAMINOPHEN 325 MG/1
650 TABLET ORAL EVERY 4 HOURS PRN
Status: DISCONTINUED | OUTPATIENT
Start: 2019-02-09 | End: 2019-02-11

## 2019-02-08 RX ORDER — HYDROCODONE BITARTRATE AND ACETAMINOPHEN 10; 325 MG/1; MG/1
2 TABLET ORAL EVERY 4 HOURS PRN
Status: DISCONTINUED | OUTPATIENT
Start: 2019-02-09 | End: 2019-02-11

## 2019-02-08 RX ORDER — HYDROCODONE BITARTRATE AND ACETAMINOPHEN 10; 325 MG/1; MG/1
1 TABLET ORAL EVERY 4 HOURS PRN
Status: DISCONTINUED | OUTPATIENT
Start: 2019-02-09 | End: 2019-02-11

## 2019-02-08 RX ORDER — ACETAMINOPHEN 325 MG/1
325 TABLET ORAL EVERY 4 HOURS PRN
Status: DISCONTINUED | OUTPATIENT
Start: 2019-02-09 | End: 2019-02-11

## 2019-02-08 RX ORDER — TIZANIDINE 2 MG/1
2 TABLET ORAL EVERY 6 HOURS PRN
Status: DISCONTINUED | OUTPATIENT
Start: 2019-02-08 | End: 2019-02-11

## 2019-02-08 RX ORDER — TIZANIDINE 4 MG/1
4 TABLET ORAL EVERY 6 HOURS PRN
Status: DISCONTINUED | OUTPATIENT
Start: 2019-02-08 | End: 2019-02-11

## 2019-02-08 NOTE — PAYOR COMM NOTE
--------------  CONTINUED STAY REVIEW    Payor: [employer] PPO  Subscriber #:  [id number]  Authorization Number: [id number]    Admit date: 2/7/19  Admit time: [facility]    Admitting Physician: Jagdish Seals MD  Attending Physician:  Jagdish Seals MD  [facility]

## 2019-02-08 NOTE — RESPIRATORY THERAPY NOTE
NANDO - Equipment Use Daily Summary:  · Set Mode CFLEX  · Usage in hours: 15:30  · 90% Pressure (EPAP) level: 9.0  · 90% Insp Pressure (IPAP):   · AHI: 1.4  · Supplemental Oxygen:   · Comments:

## 2019-02-08 NOTE — PHYSICAL THERAPY NOTE
PHYSICAL THERAPY HIP TREATMENT NOTE - INPATIENT      Room Number: 363/363-A     Session: 1&2  Number of Visits to Meet Established Goals: 4    Presenting Problem: S/p Left SYED on 02/07/19    Problem List  Active Problems:    NANDO (obstructive sleep apnea) railing?: A Lot       AM-PAC Score:  Raw Score: 19   Approx Degree of Impairment: 41.77%   Standardized Score (AM-PAC Scale): 45.44   CMS Modifier (G-Code): CK    FUNCTIONAL ABILITY STATUS  Gait Assessment   Gait Assistance: Supervision  Distance (ft): 200 was good.  was present no   is a therapist and aide. Patient End of Session: Up in chair; With [facility] staff;Needs met    ASSESSMENT      The patient is s/p Left SYED on 2/7/19.  Pt shows dec strength in jose hips, dec ROM, dec balance and flexibility

## 2019-02-08 NOTE — TELEPHONE ENCOUNTER
Dr.Lombardi calling to speak to  regarding patient. It is not urgent. Please call back, his cell #[phone].

## 2019-02-08 NOTE — OCCUPATIONAL THERAPY NOTE
OCCUPATIONAL THERAPY EVALUATION - INPATIENT     Room Number: 363/363-A  Evaluation Date: 2/8/2019  Type of Evaluation: Initial  Presenting Problem: (L SYED)    Physician Order: IP Consult to Occupational Therapy  Reason for Therapy: ADL/IADL Dysfunction and Tolerated    PAIN ASSESSMENT  Ratin  Location: (L hip)  Management Techniques: Relaxation;Repositioning    COGNITION  Overall Cognitive Status:  WFL - within functional limits    VISION  Current Vision: wears glasses all the time    PERCEPTION  Overall frequent reminders to lead with LLE . CGA needed for mobility. Patient transferred to recliner with min A needed for descent due to poor flexibility of hips. He was educated on OT goals for next session. SCDs and ice applied.    Patient End of Session: Up i modifications of tasks    Clinical Decision Making MODERATE - Analysis of occupational profile, detailed assessments, several treatment options    Overall Complexity MODERATE     OT Discharge Recommendations: Sub-acute rehabilitation(elos 8-9 days)  OT Dev

## 2019-02-08 NOTE — CM/SW NOTE
02/08/19 1000   CM/SW Referral Data   Referral Source Physician   Reason for Referral Discharge planning   Informant Patient   Social History   Recreational Drug/Alcohol Use no   Major Changes Last 6 Months no   Domestic/Partner Violence no   Suicidal I

## 2019-02-08 NOTE — PROGRESS NOTES
[facility]  Orthopedic Surgery  Progress Note    Isabella Lines Patient Status:  Inpatient    1965 MRN [id number]   [facility] 3SW-A Attending Deepti Isaac MD   Hosp Day # 1 PCP Levi Romeo MD     SUBJECTIVE:  INT medication. LABORATORY:  Recent Labs   Lab  02/08/19   0501   RBC  3.42*   HGB  10.9*   HCT  32.9*   MCV  96.2   MCH  31.9   MCHC  33.1   RDW  13.5   WBC  8.2   PLT  216.0      No results for input(s): PTP, INR in the last 168 hours.     DIAGNOSTICS:

## 2019-02-08 NOTE — PLAN OF CARE
Received from [facility]. Left hip aquacel dressing clean/dry/intact, gel ice in place. Declines need for pain medication at this time.

## 2019-02-08 NOTE — PROGRESS NOTES
[facility]  Progress Note     Flora Herman Patient Status:  Inpatient    1965 MRN [id number]   [facility] 3SW-A Attending Suha Sanderson MD   Hosp Day # 1 PCP Deyvi Rudolph MD     Chief Complaint: follow up medical ma lisinopril  2. Monitor- BP has been controlled off medications  2. Morbid obesity  1. BMI 38.74  3. Obstructive sleep apnea  4.  Osteoarthritis status post left hip arthroplasty      Plan of care: as above    Quality:  · DVT Prophylaxis: eliquis  · CODE sta

## 2019-02-09 LAB
DEPRECATED RDW RBC AUTO: 47.4 FL (ref 35.1–46.3)
ERYTHROCYTE [DISTWIDTH] IN BLOOD BY AUTOMATED COUNT: 13.3 % (ref 11–15)
HCT VFR BLD AUTO: 29.3 % (ref 39–53)
HGB BLD-MCNC: 9.9 G/DL (ref 13–17.5)
MCH RBC QN AUTO: 32.7 PG (ref 26–34)
MCHC RBC AUTO-ENTMCNC: 33.8 G/DL (ref 31–37)
MCV RBC AUTO: 96.7 FL (ref 80–100)
PLATELET # BLD AUTO: 186 10(3)UL (ref 150–450)
RBC # BLD AUTO: 3.03 X10(6)UL (ref 4.3–5.7)
WBC # BLD AUTO: 8.5 X10(3) UL (ref 4–11)

## 2019-02-09 PROCEDURE — 99231 SBSQ HOSP IP/OBS SF/LOW 25: CPT | Performed by: HOSPITALIST

## 2019-02-09 NOTE — OCCUPATIONAL THERAPY NOTE
OCCUPATIONAL THERAPY TREATMENT NOTE - INPATIENT     Room Number: 363/363-A  Session: 1   Number of Visits to Meet Established Goals: 3    Presenting Problem: (L SYED)    History related to current admission: Admitted for elective L SYED on 2/7/19.    PM incl Taking care of personal grooming such as brushing teeth?: A Little  -   Eating meals?: None    AM-PAC Score:  Score: 20  Approx Degree of Impairment: 38.32%  Standardized Score (AM-PAC Scale): 42.03  CMS Modifier (G-Code): JULIANNE    FUNCTIONAL TRANSFER ASSESSM technique education  Rehab Potential : Good  Frequency (Obs): Daily      OT Goals: progressing 2/9/18  ADL GOALS   Patient will perform Lower Body Dressing with supervision  Patient will perform Toileting with supervision     FUNCTIONAL TRANSFER GOALS   Pa

## 2019-02-09 NOTE — CM/SW NOTE
Pt's RN requested update on insurance status for [facility]. Contacted admissions at [facility] and was told as of this writing there is still no insurance authorization. They are still working on it as insurance is open until 1pm today.  If ins auth i

## 2019-02-09 NOTE — PROGRESS NOTES
S/p total hip  Mod pain  Wants to go to rehab  Incision c/d/i  Calf st nt  nvi  Hg 9.9  Cont PT  DC to rehab or home today

## 2019-02-09 NOTE — RESPIRATORY THERAPY NOTE
ANNDO - Equipment Use Daily Summary:  · Set Mode   · Usage in hours:   · 90% Pressure (EPAP) level:   · 90% Insp Pressure (IPAP):   · AHI:   · Supplemental Oxygen:  · Comments: PT STILL ON CANNOT RETREIVE DATA

## 2019-02-09 NOTE — PROGRESS NOTES
[facility]  Progress Note     Flora Herman Patient Status:  Inpatient    1965 MRN [id number]   [facility] 3SW-A Attending Suha Sanderson MD   Hosp Day # 2 PCP Deyvi Rudolph MD     Chief Complaint: follow up medical ma medications  2. Morbid obesity  1. BMI 38.74  3. Obstructive sleep apnea  4.  Osteoarthritis status post left hip arthroplasty      Plan of care: as above    Quality:  · DVT Prophylaxis: eliquis  · CODE status: full  · Benitez: no  · Central line: no    Estim

## 2019-02-09 NOTE — PHYSICAL THERAPY NOTE
PHYSICAL THERAPY HIP TREATMENT NOTE - INPATIENT      Room Number: 363/363-A     Session: 3  Number of Visits to Meet Established Goals: 4    Presenting Problem: S/p Left SYED on 02/07/19    Problem List  Active Problems:    NANDO (obstructive sleep apnea) Little   -   Climbing 3-5 steps with a railing?: A Lot       AM-PAC Score:  Raw Score: 17   Approx Degree of Impairment: 50.57%   Standardized Score (AM-PAC Scale): 42.13   CMS Modifier (G-Code): CK    FUNCTIONAL ABILITY STATUS  Gait Assessment   Gait Assi 2/7/19. Pt continues to require cueing for gait technique and occasional cueing to maintain precautions. Pt continues to refuse to participate in stair training due to R hip arthritis/pain this session.  Pt with multiple stairs in home and no family assist.

## 2019-02-10 LAB
CREAT BLD-MCNC: 0.66 MG/DL (ref 0.7–1.3)
DEPRECATED RDW RBC AUTO: 46.7 FL (ref 35.1–46.3)
ERYTHROCYTE [DISTWIDTH] IN BLOOD BY AUTOMATED COUNT: 13.2 % (ref 11–15)
HCT VFR BLD AUTO: 29.6 % (ref 39–53)
HGB BLD-MCNC: 9.7 G/DL (ref 13–17.5)
MCH RBC QN AUTO: 31.9 PG (ref 26–34)
MCHC RBC AUTO-ENTMCNC: 32.8 G/DL (ref 31–37)
MCV RBC AUTO: 97.4 FL (ref 80–100)
PLATELET # BLD AUTO: 206 10(3)UL (ref 150–450)
RBC # BLD AUTO: 3.04 X10(6)UL (ref 4.3–5.7)
WBC # BLD AUTO: 8.6 X10(3) UL (ref 4–11)

## 2019-02-10 RX ORDER — TIZANIDINE HYDROCHLORIDE 4 MG/1
4 CAPSULE, GELATIN COATED ORAL 3 TIMES DAILY PRN
Qty: 40 CAPSULE | Refills: 0 | Status: SHIPPED | OUTPATIENT
Start: 2019-02-10 | End: 2019-04-18 | Stop reason: ALTCHOICE

## 2019-02-10 RX ORDER — HYDROCODONE BITARTRATE AND ACETAMINOPHEN 10; 325 MG/1; MG/1
1-2 TABLET ORAL EVERY 4 HOURS PRN
Qty: 24 TABLET | Refills: 0 | Status: SHIPPED | OUTPATIENT
Start: 2019-02-10 | End: 2019-04-18 | Stop reason: ALTCHOICE

## 2019-02-10 NOTE — OCCUPATIONAL THERAPY NOTE
OCCUPATIONAL THERAPY TREATMENT NOTE - INPATIENT     Room Number: 363/363-A  Session: 2  Number of Visits to Meet Established Goals: 3    Presenting Problem: L SYED    History related to current admission: Admitted for elective L SYED on 2/7/19.    PMH include CI    FUNCTIONAL TRANSFER ASSESSMENT  Supine to Sit : Supervision  Sit to Stand: Supervision    Skilled Therapy Provided: Pt in supine upon entering. Pt reporting 3/10 pain at rest but increasing with movement.  Supervision for supine to EOB for cues with m simplification techniques;ADL training;Functional transfer training; Endurance training;Patient/Family education;Patient/Family training;Equipment eval/education; Compensatory technique education  Rehab Potential : Good  Frequency (Obs): Daily      OT Goals:

## 2019-02-10 NOTE — RESPIRATORY THERAPY NOTE
NANDO - Equipment Use Daily Summary:  · Set Mode CFLEX  · Usage in hours: 9.4  · 90% Pressure (EPAP) level:   · 90% Insp Pressure (IPAP): 9  · AHI: 3  · Supplemental Oxygen:  · Comments:

## 2019-02-10 NOTE — PROGRESS NOTES
Patient medically cleared for discharge by internal medicine,  Will keep off lisinopril as BP controlled off medications. Will sign off, all dc paperwork reviewed and completed    Please call if any questions arise      Freddie Tyler M.D.   [facility]

## 2019-02-10 NOTE — PROGRESS NOTES
[facility]  Progress Note    Fahad Higuera Patient Status:  Inpatient    1965 MRN [id number]   [facility] 3SW-A Attending Gabino Cm MD   Hosp Day # 3 PCP Brneda Raygoza MD     Subjective:POD #3   S/P Left THR  No curr

## 2019-02-10 NOTE — PHYSICAL THERAPY NOTE
PHYSICAL THERAPY HIP TREATMENT NOTE - INPATIENT      Room Number: 363/363-A     Session: 4  Number of Visits to Meet Established Goals: 4    Presenting Problem: S/p Left SYED on 02/07/19    Problem List  Active Problems:    NANDO (obstructive sleep apnea) with a railing?: A Little       AM-PAC Score:  Raw Score: 20   Approx Degree of Impairment: 35.83%   Standardized Score (AM-PAC Scale): 47.67   CMS Modifier (G-Code): CJ    FUNCTIONAL ABILITY STATUS  Gait Assessment   Gait Assistance: Supervision  Distance completed and this patient is demonstrating a 36% degree of impairment in mobility. Research supports that patients with this level of impairment may benefit from home with HHPT.  However pt needs supervision and pt indicated that he lives alone in a home w

## 2019-02-11 VITALS
SYSTOLIC BLOOD PRESSURE: 119 MMHG | RESPIRATION RATE: 18 BRPM | OXYGEN SATURATION: 97 % | BODY MASS INDEX: 38.65 KG/M2 | WEIGHT: 270 LBS | TEMPERATURE: 99 F | HEART RATE: 79 BPM | HEIGHT: 70 IN | DIASTOLIC BLOOD PRESSURE: 58 MMHG

## 2019-02-11 NOTE — CM/SW NOTE
Updates sent via [facility] to [facility] [phone]. Spoke with Melvin Peña in admissions- insurance auth still pending. She notes that facility has requested urgent review. Will keep SW updated re: ins auth.

## 2019-02-11 NOTE — CM/SW NOTE
02/11/19 1400   Discharge disposition   Expected discharge disposition Skilled Nurs   Name of Facillity/Home Care/Hospice [address] [facility] Final   Patient is Discharged to a [address] [place] [address] Yes   Discharge transportation [facility]

## 2019-02-11 NOTE — PAYOR COMM NOTE
--------------  CONTINUED STAY REVIEW    Payor: BC[employer] PPO  Subscriber #:  [id number]  Authorization Number: [id number]    Admit date: 2/7/19  Admit time: [facility]    Admitting Physician: Hernan Pena MD  Attending Physician:  Hernan Pena MD  [facility] sleep apnea  3. Osteoarthritis status post left hip arthroplasty      2/10  4. Continue pain management  5. Continue DVT prophylaxis   6. Continue PT/OT  7.  Continue medical management    PLEASE FAX DAYS CERTIFIED AND NEXT REVIEW DATE

## 2019-02-11 NOTE — PHYSICAL THERAPY NOTE
PHYSICAL THERAPY HIP TREATMENT NOTE - INPATIENT      Room Number: 363/363-A     Session: 5  Number of Visits to Meet Established Goals: 4    Presenting Problem: S/p Left SYED on 02/07/19      Problem List  Active Problems:    NANDO (obstructive sleep apnea) Climbing 3-5 steps with a railing?: A Lot       AM-PAC Score:  Raw Score: 18   Approx Degree of Impairment: 46.58%   Standardized Score (AM-PAC Scale): 43.63   CMS Modifier (G-Code): CK    FUNCTIONAL ABILITY STATUS  Gait Assessment   Gait Assistance: Super conservation;Patient education; Family education;Gait training;Neuromuscular re-educate;Strengthening;Stoop training;Stair training;Transfer training;Balance training  Rehab Potential : Good  Frequency (Obs): Daily      CURRENT GOALS  Goal #1  Patient is ab

## 2019-02-11 NOTE — PLAN OF CARE
DISCHARGE PLANNING    • Discharge to home or other facility with appropriate resources Progressing        Impaired Activities of Daily Living    • Achieve highest/safest level of independence in self care Progressing        Impaired Functional Mobility decrease in pain as evidenced by decrease in pain scale. Will continue to monitor.

## 2019-02-11 NOTE — CM/SW NOTE
Call from [place] with [address]. Facility has obtained insurance auth and can accept pt with a 3PM d/c time. Spoke with pt re: above and est cost of [employer]- he agrees with plan.     Liat Navarro cannot accept transport until 4:30PM to above

## 2019-02-11 NOTE — RESPIRATORY THERAPY NOTE
NANDO Equipment Usage Summary :            Set Mode :AUTO CPAP W FLEX          Usage in Hours:9;02          90% Pressure (EPAP) : 9.4           90% Insp Pressure (IPAP);           AHI : 1.8          Supplemental Oxygen :      LPM

## 2019-02-11 NOTE — PROGRESS NOTES
NURSING DISCHARGE NOTE    Discharged Rehab facility via Ambulance. Belongings Taken by patient/family. Report was called to the [facility] and a script for Norco, Tizanidine and Eliquis are being sent over.

## 2019-02-12 NOTE — DISCHARGE SUMMARY
Patient ID:  Roula Granados  [id number]  48year old  12/19/1965    Admit Date: 2/7/2019    Discharge Date and Time: 2/11/2019     Attending Physician: Tmaara Nichole MD    Reason for admission: Primary osteoarthritis of left hip [M16.12]    Discharge [facility] R-0    docusate sodium (COLACE) 100 MG Oral Cap  Take 1 capsule (100 mg total) by mouth 2 (two) times daily. , Normal, Disp-60 capsule, R-0    !! HYDROcodone-acetaminophen (NORCO)  MG Oral Tab  1-2 tabs every 4 hours, prn, Normal, Disp-50 tablet, R-0

## 2019-02-12 NOTE — PAYOR COMM NOTE
--------------  DISCHARGE REVIEW    Payor: [facility]  Subscriber #:  [id number]  Authorization Number: [id number]    Admit date: 2/7/19  Admit time:  1324  Discharge Date: 2/11/2019  5:00 PM     Admitting Physician: Yash Holland MD  Attending Physici

## 2019-02-15 NOTE — OCCUPATIONAL THERAPY NOTE
OCCUPATIONAL THERAPY TREATMENT NOTE - INPATIENT     Room Number: 363/363-A  Session: 3/3  Number of Visits to Meet Established Goals: 3    Presenting Problem: L SYED    History related to current admission:    Admitted for elective L SYED on 2/7/19.    PMH in Scale): 51.12  CMS Modifier (G-Code): CI    FUNCTIONAL TRANSFER ASSESSMENT  Supine to Sit : Supervision  Sit to Stand: Supervision    Skilled Therapy Provided: OT educated patient on use of AE for socks and shoes.  Patient able to doff slipper socks with

## 2019-02-26 ENCOUNTER — MED REC SCAN ONLY (OUTPATIENT)
Dept: FAMILY MEDICINE CLINIC | Facility: CLINIC | Age: 54
End: 2019-02-26

## 2019-03-07 ENCOUNTER — OFFICE VISIT (OUTPATIENT)
Dept: INTERNAL MEDICINE CLINIC | Facility: CLINIC | Age: 54
End: 2019-03-07
Payer: COMMERCIAL

## 2019-03-07 VITALS
WEIGHT: 269 LBS | DIASTOLIC BLOOD PRESSURE: 78 MMHG | HEART RATE: 78 BPM | RESPIRATION RATE: 16 BRPM | HEIGHT: 70 IN | BODY MASS INDEX: 38.51 KG/M2 | SYSTOLIC BLOOD PRESSURE: 118 MMHG

## 2019-03-07 DIAGNOSIS — E66.01 MORBID OBESITY (HCC): ICD-10-CM

## 2019-03-07 DIAGNOSIS — Z51.81 THERAPEUTIC DRUG MONITORING: Primary | ICD-10-CM

## 2019-03-07 DIAGNOSIS — E66.9 OBESITY, CLASS II, BMI 35-39.9: ICD-10-CM

## 2019-03-07 PROCEDURE — 99213 OFFICE O/P EST LOW 20 MIN: CPT | Performed by: INTERNAL MEDICINE

## 2019-03-07 RX ORDER — TOPIRAMATE 25 MG/1
25 TABLET ORAL 2 TIMES DAILY
Qty: 60 TABLET | Refills: 0 | Status: CANCELLED | OUTPATIENT
Start: 2019-03-07

## 2019-03-07 RX ORDER — TOPIRAMATE 50 MG/1
50 TABLET, FILM COATED ORAL 2 TIMES DAILY
Qty: 60 TABLET | Refills: 0 | Status: SHIPPED | OUTPATIENT
Start: 2019-03-07 | End: 2019-04-10

## 2019-03-07 RX ORDER — LUBIPROSTONE 24 UG/1
CAPSULE, GELATIN COATED ORAL
Refills: 0 | COMMUNITY
Start: 2019-02-19 | End: 2019-04-18 | Stop reason: ALTCHOICE

## 2019-03-07 NOTE — PROGRESS NOTES
HISTORY OF PRESENT ILLNESS  Patient presents with:  Weight Check: down 1 lb      Lilyan Meckel is a 48year old male here for follow up in medical weight loss program.     Denies chest pain, shortness of breath, dizziness, blurred vision, headache, parest 01/17/2019    GLOBULIN 3.5 01/17/2019    AGRATIO 1.8 11/21/2014     02/07/2019    K 3.9 02/07/2019     02/07/2019    CO2 29.0 02/07/2019     Lab Results   Component Value Date     03/27/2018    A1C 5.9 (H) 03/27/2018     Lab Results   Co mouth 2 (two) times daily. Disp: 60 tablet Rfl: 0     No current facility-administered medications on file prior to visit.      ASSESSMENT  Analyzed weight data:       Diagnoses and all orders for this visit:    Therapeutic drug monitoring  -     topiramate

## 2019-03-12 ENCOUNTER — OFFICE VISIT (OUTPATIENT)
Dept: INTERNAL MEDICINE CLINIC | Facility: CLINIC | Age: 54
End: 2019-03-12
Payer: COMMERCIAL

## 2019-03-12 VITALS — BODY MASS INDEX: 39 KG/M2 | WEIGHT: 274.63 LBS

## 2019-03-12 DIAGNOSIS — I10 HYPERTENSION, UNSPECIFIED TYPE: ICD-10-CM

## 2019-03-12 DIAGNOSIS — E66.9 OBESITY, CLASS II, BMI 35-39.9: Primary | ICD-10-CM

## 2019-03-12 DIAGNOSIS — E78.5 DYSLIPIDEMIA: ICD-10-CM

## 2019-03-12 PROCEDURE — 97803 MED NUTRITION INDIV SUBSEQ: CPT | Performed by: DIETITIAN, REGISTERED

## 2019-03-12 NOTE — PROGRESS NOTES
FOLLOW UP NUTRITION CONSULTATION    Nutrition Assessment    Number of consultations with dietitian: 6    Height:  Ht Readings from Last 1 Encounters:  03/07/19 : 70\"      Weight:   Wt Readings from Last 2 Encounters:  03/12/19 : 274 lb 9.6 oz  03/07/19

## 2019-03-20 PROBLEM — Z96.642 STATUS POST TOTAL REPLACEMENT OF LEFT HIP: Status: ACTIVE | Noted: 2019-03-20

## 2019-03-22 ENCOUNTER — TELEPHONE (OUTPATIENT)
Dept: FAMILY MEDICINE CLINIC | Facility: CLINIC | Age: 54
End: 2019-03-22

## 2019-03-22 NOTE — TELEPHONE ENCOUNTER
Pt called and set up his pre op appt for May 2nd 2019. Filled out pink sheet put in Dr. Oneil Agee folder.

## 2019-04-10 ENCOUNTER — OFFICE VISIT (OUTPATIENT)
Dept: INTERNAL MEDICINE CLINIC | Facility: CLINIC | Age: 54
End: 2019-04-10
Payer: COMMERCIAL

## 2019-04-10 VITALS
BODY MASS INDEX: 38.08 KG/M2 | WEIGHT: 266 LBS | DIASTOLIC BLOOD PRESSURE: 78 MMHG | HEIGHT: 70 IN | RESPIRATION RATE: 16 BRPM | HEART RATE: 80 BPM | SYSTOLIC BLOOD PRESSURE: 128 MMHG

## 2019-04-10 DIAGNOSIS — E66.9 OBESITY (BMI 30-39.9): ICD-10-CM

## 2019-04-10 DIAGNOSIS — Z71.3 DIETARY COUNSELING AND SURVEILLANCE: ICD-10-CM

## 2019-04-10 DIAGNOSIS — Z51.81 THERAPEUTIC DRUG MONITORING: Primary | ICD-10-CM

## 2019-04-10 PROCEDURE — 99213 OFFICE O/P EST LOW 20 MIN: CPT | Performed by: INTERNAL MEDICINE

## 2019-04-10 RX ORDER — TOPIRAMATE 50 MG/1
50 TABLET, FILM COATED ORAL 2 TIMES DAILY
Qty: 60 TABLET | Refills: 0 | Status: SHIPPED | OUTPATIENT
Start: 2019-04-10 | End: 2019-05-13

## 2019-04-10 NOTE — PROGRESS NOTES
Preventative Counseling for Diet and Exercise     /78   Pulse 80   Resp 16   Ht 70\"   Wt 266 lb   BMI 38.17 kg/m²   Body mass index is 38.17 kg/m².     Wt Readings from Last 6 Encounters:  04/10/19 : 266 lb  03/12/19 : 274 lb 9.6 oz  03/07/19 : 269 l

## 2019-04-10 NOTE — PROGRESS NOTES
HISTORY OF PRESENT ILLNESS  Patient presents with:  Weight Check: down 3 lb      Raymundo Echavarria is a 48year old male here for follow up in medical weight loss program.     Denies chest pain, shortness of breath, dizziness, blurred vision, headache, parest 01/17/2019    AGRATIO 1.8 11/21/2014     02/07/2019    K 3.9 02/07/2019     02/07/2019    CO2 29.0 02/07/2019     Lab Results   Component Value Date     03/27/2018    A1C 5.9 (H) 03/27/2018     Lab Results   Component Value Date    RAYO [DISCONTINUED] topiramate 50 MG Oral Tab Take 1 tablet (50 mg total) by mouth 2 (two) times daily. Disp: 60 tablet Rfl: 0     No current facility-administered medications on file prior to visit.      ASSESSMENT  Analyzed weight data:       Diagnoses and a

## 2019-05-02 ENCOUNTER — OFFICE VISIT (OUTPATIENT)
Dept: FAMILY MEDICINE CLINIC | Facility: CLINIC | Age: 54
End: 2019-05-02
Payer: COMMERCIAL

## 2019-05-02 ENCOUNTER — LAB ENCOUNTER (OUTPATIENT)
Dept: LAB | Age: 54
End: 2019-05-02
Attending: EMERGENCY MEDICINE
Payer: COMMERCIAL

## 2019-05-02 VITALS
TEMPERATURE: 98 F | SYSTOLIC BLOOD PRESSURE: 122 MMHG | HEIGHT: 70 IN | DIASTOLIC BLOOD PRESSURE: 84 MMHG | WEIGHT: 274 LBS | BODY MASS INDEX: 39.22 KG/M2 | RESPIRATION RATE: 16 BRPM | HEART RATE: 76 BPM | OXYGEN SATURATION: 98 %

## 2019-05-02 DIAGNOSIS — E66.9 OBESITY (BMI 30-39.9): ICD-10-CM

## 2019-05-02 DIAGNOSIS — Z01.818 PRE-OP EXAM: Primary | ICD-10-CM

## 2019-05-02 DIAGNOSIS — I10 HYPERTENSION, UNSPECIFIED TYPE: ICD-10-CM

## 2019-05-02 DIAGNOSIS — Z01.818 PRE-OP EXAM: ICD-10-CM

## 2019-05-02 PROCEDURE — 80048 BASIC METABOLIC PNL TOTAL CA: CPT | Performed by: EMERGENCY MEDICINE

## 2019-05-02 PROCEDURE — 86900 BLOOD TYPING SEROLOGIC ABO: CPT | Performed by: EMERGENCY MEDICINE

## 2019-05-02 PROCEDURE — 87081 CULTURE SCREEN ONLY: CPT | Performed by: EMERGENCY MEDICINE

## 2019-05-02 PROCEDURE — 36415 COLL VENOUS BLD VENIPUNCTURE: CPT | Performed by: EMERGENCY MEDICINE

## 2019-05-02 PROCEDURE — 86901 BLOOD TYPING SEROLOGIC RH(D): CPT | Performed by: EMERGENCY MEDICINE

## 2019-05-02 PROCEDURE — 85025 COMPLETE CBC W/AUTO DIFF WBC: CPT | Performed by: EMERGENCY MEDICINE

## 2019-05-02 PROCEDURE — 99243 OFF/OP CNSLTJ NEW/EST LOW 30: CPT | Performed by: EMERGENCY MEDICINE

## 2019-05-02 PROCEDURE — 86850 RBC ANTIBODY SCREEN: CPT | Performed by: EMERGENCY MEDICINE

## 2019-05-02 NOTE — PATIENT INSTRUCTIONS
Thank you for choosing [facility]  To Do:  FOR [address]    · Have blood tests done  · Follow up in 6 months  · COntinue with weight loss.   · Cotinue with BP monitoring  · Arrange for colonoscopy, Dr. Adelina Rose

## 2019-05-02 NOTE — PROGRESS NOTES
Jen Rodríguez is a 48year old male who presents for a pre-operative physical exam.   HPI related to surgery:   Jen Rodríguez is scheduled for a left hip replacement procedure at [facility]  on 5/23/19 to be performed by Dr. Yunior Tomlinson    Indication: vomiting, constipation, diarrhea;   GENITAL/: no dysuria, urgency or frequency  MUSCULOSKELETAL: + right hip pain  NEURO: no convulsions, abnormal sensation, no issues with sleeping. PSYCHE: no hyper- or hypoactivity, good energy level.   HEMATOLOGY: no year. Encouraged to continue with [facility] aftr surgery. Continue with weight loss though weigh loss clinic program   On Topamax    3.  Hypertension, unspecified type   Has D/C lisinopril and BP has been normotensive   Appears resolved with weight loss      Pl

## 2019-05-06 ENCOUNTER — HOSPITAL ENCOUNTER (OUTPATIENT)
Dept: PHYSICAL THERAPY | Facility: HOSPITAL | Age: 54
Discharge: HOME | End: 2019-05-06
Attending: ORTHOPAEDIC SURGERY
Payer: COMMERCIAL

## 2019-05-06 DIAGNOSIS — M16.11 PRIMARY OSTEOARTHRITIS OF RIGHT HIP: ICD-10-CM

## 2019-05-07 NOTE — TELEPHONE ENCOUNTER
Pts pre op notes and labs were faxed to [address] Pre Admission at [phone]. Fax confirmation was received.

## 2019-05-08 PROBLEM — M16.11 PRIMARY OSTEOARTHRITIS OF RIGHT HIP: Status: ACTIVE | Noted: 2019-05-08

## 2019-05-13 ENCOUNTER — OFFICE VISIT (OUTPATIENT)
Dept: INTERNAL MEDICINE CLINIC | Facility: CLINIC | Age: 54
End: 2019-05-13
Payer: COMMERCIAL

## 2019-05-13 VITALS
BODY MASS INDEX: 38.8 KG/M2 | HEART RATE: 78 BPM | HEIGHT: 70 IN | SYSTOLIC BLOOD PRESSURE: 128 MMHG | RESPIRATION RATE: 16 BRPM | WEIGHT: 271 LBS | DIASTOLIC BLOOD PRESSURE: 78 MMHG

## 2019-05-13 DIAGNOSIS — M16.0 PRIMARY OSTEOARTHRITIS OF BOTH HIPS: ICD-10-CM

## 2019-05-13 DIAGNOSIS — E66.9 OBESITY (BMI 30-39.9): ICD-10-CM

## 2019-05-13 DIAGNOSIS — E78.5 DYSLIPIDEMIA: ICD-10-CM

## 2019-05-13 DIAGNOSIS — Z51.81 THERAPEUTIC DRUG MONITORING: Primary | ICD-10-CM

## 2019-05-13 DIAGNOSIS — R63.5 WEIGHT GAIN: ICD-10-CM

## 2019-05-13 PROCEDURE — 99214 OFFICE O/P EST MOD 30 MIN: CPT | Performed by: INTERNAL MEDICINE

## 2019-05-13 RX ORDER — TOPIRAMATE 50 MG/1
50 TABLET, FILM COATED ORAL 2 TIMES DAILY
Qty: 60 TABLET | Refills: 0 | Status: SHIPPED | OUTPATIENT
Start: 2019-05-13 | End: 2019-05-13

## 2019-05-13 RX ORDER — TOPIRAMATE 50 MG/1
50 TABLET, FILM COATED ORAL 2 TIMES DAILY
Qty: 60 TABLET | Refills: 1 | Status: SHIPPED | OUTPATIENT
Start: 2019-05-13 | End: 2019-06-12

## 2019-05-13 NOTE — PROGRESS NOTES
HISTORY OF PRESENT ILLNESS  Patient presents with:  Weight Check: u 5 lb      Sheryl Fowler is a 48year old male here for follow up in medical weight loss program.     Denies chest pain, shortness of breath, dizziness, blurred vision, headache, paresthes 11/21/2014    GFRNAA 99 05/02/2019    GFRAA 115 05/02/2019    CA 9.2 05/02/2019    OSMOCALC 295 05/02/2019    ALKPHO 30 (L) 01/17/2019    AST 26 01/17/2019    ALT 45 01/17/2019    BILT 0.8 01/17/2019    TP 7.6 01/17/2019    ALB 4.1 01/17/2019    GLOBULIN 3 osteoarthritis of both hips    Dyslipidemia        PLAN  · Weight max: 2/2/18 at 408 lbs   · Up 5 lb  · Lost 137  lbs thus far   · Continue topamax to 50 mg bid  · We reviewed weight gain 2/2 to falling off plan and stress eating    · Reviewed some fast fo

## 2019-05-17 ENCOUNTER — TELEPHONE (OUTPATIENT)
Dept: FAMILY MEDICINE CLINIC | Facility: CLINIC | Age: 54
End: 2019-05-17

## 2019-05-17 DIAGNOSIS — Z01.818 PRE-OP EXAM: Primary | ICD-10-CM

## 2019-05-17 NOTE — TELEPHONE ENCOUNTER
Cecil Lim called from [facility] from pre admission testing, requesting EKG results. Informed her EKG was not done in office, she stated for surgery patient needs a EKG done within 30 days. Please advise.

## 2019-05-18 ENCOUNTER — APPOINTMENT (OUTPATIENT)
Dept: LAB | Age: 54
End: 2019-05-18
Attending: EMERGENCY MEDICINE
Payer: COMMERCIAL

## 2019-05-18 DIAGNOSIS — Z01.818 PRE-OP EXAM: ICD-10-CM

## 2019-05-18 PROCEDURE — 93010 ELECTROCARDIOGRAM REPORT: CPT | Performed by: INTERNAL MEDICINE

## 2019-05-18 PROCEDURE — 93005 ELECTROCARDIOGRAM TRACING: CPT

## 2019-05-23 ENCOUNTER — APPOINTMENT (OUTPATIENT)
Dept: GENERAL RADIOLOGY | Facility: HOSPITAL | Age: 54
DRG: 470 | End: 2019-05-23
Attending: ORTHOPAEDIC SURGERY
Payer: COMMERCIAL

## 2019-05-23 ENCOUNTER — HOSPITAL ENCOUNTER (INPATIENT)
Facility: HOSPITAL | Age: 54
LOS: 2 days | Discharge: HOME HEALTH | DRG: 470 | End: 2019-05-25
Attending: ORTHOPAEDIC SURGERY | Admitting: ORTHOPAEDIC SURGERY
Payer: COMMERCIAL

## 2019-05-23 ENCOUNTER — ANESTHESIA EVENT (OUTPATIENT)
Dept: SURGERY | Facility: HOSPITAL | Age: 54
DRG: 470 | End: 2019-05-23
Payer: COMMERCIAL

## 2019-05-23 ENCOUNTER — ANESTHESIA (OUTPATIENT)
Dept: SURGERY | Facility: HOSPITAL | Age: 54
DRG: 470 | End: 2019-05-23
Payer: COMMERCIAL

## 2019-05-23 DIAGNOSIS — M16.11 PRIMARY OSTEOARTHRITIS OF RIGHT HIP: Primary | ICD-10-CM

## 2019-05-23 PROBLEM — E66.01 CLASS 3 SEVERE OBESITY IN ADULT (HCC): Status: ACTIVE | Noted: 2019-05-23

## 2019-05-23 PROCEDURE — 3E0T3BZ INTRODUCTION OF ANESTHETIC AGENT INTO PERIPHERAL NERVES AND PLEXI, PERCUTANEOUS APPROACH: ICD-10-PCS | Performed by: ANESTHESIOLOGY

## 2019-05-23 PROCEDURE — 0SR904A REPLACEMENT OF RIGHT HIP JOINT WITH CERAMIC ON POLYETHYLENE SYNTHETIC SUBSTITUTE, UNCEMENTED, OPEN APPROACH: ICD-10-PCS | Performed by: ORTHOPAEDIC SURGERY

## 2019-05-23 PROCEDURE — 73501 X-RAY EXAM HIP UNI 1 VIEW: CPT | Performed by: PHYSICIAN ASSISTANT

## 2019-05-23 PROCEDURE — 99222 1ST HOSP IP/OBS MODERATE 55: CPT | Performed by: HOSPITALIST

## 2019-05-23 PROCEDURE — 5A09357 ASSISTANCE WITH RESPIRATORY VENTILATION, LESS THAN 24 CONSECUTIVE HOURS, CONTINUOUS POSITIVE AIRWAY PRESSURE: ICD-10-PCS | Performed by: ORTHOPAEDIC SURGERY

## 2019-05-23 DEVICE — PINNACLE HIP SOLUTIONS ALTRX POLYETHYLENE ACETABULAR LINER +4 NEUTRAL 36MM ID 58MM OD
Type: IMPLANTABLE DEVICE | Site: HIP | Status: FUNCTIONAL
Brand: PINNACLE ALTRX

## 2019-05-23 DEVICE — PINNACLE 100 ACETABULAR SHELL GRIPTION 100 58MM OD
Type: IMPLANTABLE DEVICE | Site: HIP | Status: FUNCTIONAL
Brand: PINNACLE GRIPTION

## 2019-05-23 DEVICE — APEX HOLE ELIMINATOR - PS
Type: IMPLANTABLE DEVICE | Site: HIP | Status: FUNCTIONAL
Brand: APEX

## 2019-05-23 DEVICE — CORAIL HIP SYSTEM CEMENTLESS FEMORAL STEM 12/14 AMT 135 DEGREES KS SIZE 12 HA COATED STANDARD NO COLLAR
Type: IMPLANTABLE DEVICE | Site: HIP | Status: FUNCTIONAL
Brand: CORAIL

## 2019-05-23 DEVICE — BIOLOX DELTA CERAMIC FEMORAL HEAD +1.5 36MM DIA 12/14 TAPER
Type: IMPLANTABLE DEVICE | Site: HIP | Status: FUNCTIONAL
Brand: BIOLOX DELTA

## 2019-05-23 RX ORDER — OXYCODONE HYDROCHLORIDE 5 MG/1
5 TABLET ORAL EVERY 4 HOURS PRN
Status: ACTIVE | OUTPATIENT
Start: 2019-05-23 | End: 2019-05-25

## 2019-05-23 RX ORDER — DOCUSATE SODIUM 100 MG/1
100 CAPSULE, LIQUID FILLED ORAL 2 TIMES DAILY
Status: DISCONTINUED | OUTPATIENT
Start: 2019-05-23 | End: 2019-05-25

## 2019-05-23 RX ORDER — PROCHLORPERAZINE EDISYLATE 5 MG/ML
10 INJECTION INTRAMUSCULAR; INTRAVENOUS EVERY 6 HOURS PRN
Status: ACTIVE | OUTPATIENT
Start: 2019-05-23 | End: 2019-05-25

## 2019-05-23 RX ORDER — SODIUM CHLORIDE, SODIUM LACTATE, POTASSIUM CHLORIDE, CALCIUM CHLORIDE 600; 310; 30; 20 MG/100ML; MG/100ML; MG/100ML; MG/100ML
INJECTION, SOLUTION INTRAVENOUS CONTINUOUS
Status: DISCONTINUED | OUTPATIENT
Start: 2019-05-23 | End: 2019-05-23 | Stop reason: HOSPADM

## 2019-05-23 RX ORDER — CEFAZOLIN SODIUM/WATER 2 G/20 ML
2 SYRINGE (ML) INTRAVENOUS EVERY 8 HOURS
Status: COMPLETED | OUTPATIENT
Start: 2019-05-23 | End: 2019-05-23

## 2019-05-23 RX ORDER — HYDROCODONE BITARTRATE AND ACETAMINOPHEN 10; 325 MG/1; MG/1
1 TABLET ORAL AS NEEDED
Status: DISCONTINUED | OUTPATIENT
Start: 2019-05-23 | End: 2019-05-23 | Stop reason: HOSPADM

## 2019-05-23 RX ORDER — SODIUM PHOSPHATE, DIBASIC AND SODIUM PHOSPHATE, MONOBASIC 7; 19 G/133ML; G/133ML
1 ENEMA RECTAL ONCE AS NEEDED
Status: DISCONTINUED | OUTPATIENT
Start: 2019-05-23 | End: 2019-05-25

## 2019-05-23 RX ORDER — DIPHENHYDRAMINE HYDROCHLORIDE 50 MG/ML
25 INJECTION INTRAMUSCULAR; INTRAVENOUS ONCE AS NEEDED
Status: ACTIVE | OUTPATIENT
Start: 2019-05-23 | End: 2019-05-23

## 2019-05-23 RX ORDER — OXYCODONE HYDROCHLORIDE 15 MG/1
15 TABLET ORAL EVERY 4 HOURS PRN
Status: DISPENSED | OUTPATIENT
Start: 2019-05-23 | End: 2019-05-25

## 2019-05-23 RX ORDER — ACETAMINOPHEN 500 MG
1000 TABLET ORAL ONCE
Status: DISCONTINUED | OUTPATIENT
Start: 2019-05-23 | End: 2019-05-23

## 2019-05-23 RX ORDER — MELATONIN
325
Status: DISCONTINUED | OUTPATIENT
Start: 2019-05-23 | End: 2019-05-25

## 2019-05-23 RX ORDER — POLYETHYLENE GLYCOL 3350 17 G/17G
17 POWDER, FOR SOLUTION ORAL DAILY PRN
Status: DISCONTINUED | OUTPATIENT
Start: 2019-05-23 | End: 2019-05-25

## 2019-05-23 RX ORDER — MIDAZOLAM HYDROCHLORIDE 1 MG/ML
1 INJECTION INTRAMUSCULAR; INTRAVENOUS EVERY 5 MIN PRN
Status: DISCONTINUED | OUTPATIENT
Start: 2019-05-23 | End: 2019-05-23 | Stop reason: HOSPADM

## 2019-05-23 RX ORDER — DIPHENHYDRAMINE HCL 25 MG
25 CAPSULE ORAL EVERY 4 HOURS PRN
Status: DISCONTINUED | OUTPATIENT
Start: 2019-05-23 | End: 2019-05-25

## 2019-05-23 RX ORDER — ACETAMINOPHEN 325 MG/1
TABLET ORAL
Status: COMPLETED
Start: 2019-05-23 | End: 2019-05-23

## 2019-05-23 RX ORDER — METOCLOPRAMIDE HYDROCHLORIDE 5 MG/ML
10 INJECTION INTRAMUSCULAR; INTRAVENOUS AS NEEDED
Status: DISCONTINUED | OUTPATIENT
Start: 2019-05-23 | End: 2019-05-23 | Stop reason: HOSPADM

## 2019-05-23 RX ORDER — TIZANIDINE 4 MG/1
4 TABLET ORAL 3 TIMES DAILY PRN
Status: DISCONTINUED | OUTPATIENT
Start: 2019-05-23 | End: 2019-05-25

## 2019-05-23 RX ORDER — DIPHENHYDRAMINE HYDROCHLORIDE 50 MG/ML
12.5 INJECTION INTRAMUSCULAR; INTRAVENOUS EVERY 4 HOURS PRN
Status: DISCONTINUED | OUTPATIENT
Start: 2019-05-23 | End: 2019-05-25

## 2019-05-23 RX ORDER — SODIUM CHLORIDE 9 MG/ML
INJECTION, SOLUTION INTRAVENOUS CONTINUOUS
Status: DISCONTINUED | OUTPATIENT
Start: 2019-05-23 | End: 2019-05-25

## 2019-05-23 RX ORDER — HYDROMORPHONE HYDROCHLORIDE 1 MG/ML
0.8 INJECTION, SOLUTION INTRAMUSCULAR; INTRAVENOUS; SUBCUTANEOUS EVERY 2 HOUR PRN
Status: ACTIVE | OUTPATIENT
Start: 2019-05-23 | End: 2019-05-25

## 2019-05-23 RX ORDER — NALOXONE HYDROCHLORIDE 0.4 MG/ML
80 INJECTION, SOLUTION INTRAMUSCULAR; INTRAVENOUS; SUBCUTANEOUS AS NEEDED
Status: DISCONTINUED | OUTPATIENT
Start: 2019-05-23 | End: 2019-05-23 | Stop reason: HOSPADM

## 2019-05-23 RX ORDER — HYDROMORPHONE HYDROCHLORIDE 1 MG/ML
0.2 INJECTION, SOLUTION INTRAMUSCULAR; INTRAVENOUS; SUBCUTANEOUS EVERY 2 HOUR PRN
Status: ACTIVE | OUTPATIENT
Start: 2019-05-23 | End: 2019-05-25

## 2019-05-23 RX ORDER — HYDROCODONE BITARTRATE AND ACETAMINOPHEN 10; 325 MG/1; MG/1
2 TABLET ORAL AS NEEDED
Status: DISCONTINUED | OUTPATIENT
Start: 2019-05-23 | End: 2019-05-23 | Stop reason: HOSPADM

## 2019-05-23 RX ORDER — ONDANSETRON 2 MG/ML
4 INJECTION INTRAMUSCULAR; INTRAVENOUS AS NEEDED
Status: DISCONTINUED | OUTPATIENT
Start: 2019-05-23 | End: 2019-05-23 | Stop reason: HOSPADM

## 2019-05-23 RX ORDER — ZOLPIDEM TARTRATE 5 MG/1
5 TABLET ORAL NIGHTLY PRN
Status: DISCONTINUED | OUTPATIENT
Start: 2019-05-23 | End: 2019-05-25

## 2019-05-23 RX ORDER — METOCLOPRAMIDE HYDROCHLORIDE 5 MG/ML
10 INJECTION INTRAMUSCULAR; INTRAVENOUS EVERY 6 HOURS PRN
Status: ACTIVE | OUTPATIENT
Start: 2019-05-23 | End: 2019-05-25

## 2019-05-23 RX ORDER — ONDANSETRON 2 MG/ML
4 INJECTION INTRAMUSCULAR; INTRAVENOUS EVERY 4 HOURS PRN
Status: ACTIVE | OUTPATIENT
Start: 2019-05-23 | End: 2019-05-25

## 2019-05-23 RX ORDER — SODIUM CHLORIDE, SODIUM LACTATE, POTASSIUM CHLORIDE, CALCIUM CHLORIDE 600; 310; 30; 20 MG/100ML; MG/100ML; MG/100ML; MG/100ML
INJECTION, SOLUTION INTRAVENOUS CONTINUOUS
Status: DISCONTINUED | OUTPATIENT
Start: 2019-05-23 | End: 2019-05-25

## 2019-05-23 RX ORDER — OXYCODONE HYDROCHLORIDE 10 MG/1
10 TABLET ORAL EVERY 4 HOURS PRN
Status: DISPENSED | OUTPATIENT
Start: 2019-05-23 | End: 2019-05-25

## 2019-05-23 RX ORDER — MEPERIDINE HYDROCHLORIDE 25 MG/ML
12.5 INJECTION INTRAMUSCULAR; INTRAVENOUS; SUBCUTANEOUS AS NEEDED
Status: DISCONTINUED | OUTPATIENT
Start: 2019-05-23 | End: 2019-05-23 | Stop reason: HOSPADM

## 2019-05-23 RX ORDER — TRAMADOL HYDROCHLORIDE 50 MG/1
50 TABLET ORAL EVERY 6 HOURS PRN
COMMUNITY
End: 2019-09-09

## 2019-05-23 RX ORDER — ACETAMINOPHEN 325 MG/1
650 TABLET ORAL EVERY 6 HOURS PRN
Status: DISCONTINUED | OUTPATIENT
Start: 2019-05-23 | End: 2019-05-23 | Stop reason: HOSPADM

## 2019-05-23 RX ORDER — HYDROMORPHONE HYDROCHLORIDE 1 MG/ML
0.4 INJECTION, SOLUTION INTRAMUSCULAR; INTRAVENOUS; SUBCUTANEOUS EVERY 2 HOUR PRN
Status: DISPENSED | OUTPATIENT
Start: 2019-05-23 | End: 2019-05-25

## 2019-05-23 RX ORDER — SCOLOPAMINE TRANSDERMAL SYSTEM 1 MG/1
1 PATCH, EXTENDED RELEASE TRANSDERMAL ONCE
Status: DISCONTINUED | OUTPATIENT
Start: 2019-05-23 | End: 2019-05-25

## 2019-05-23 RX ORDER — BISACODYL 10 MG
10 SUPPOSITORY, RECTAL RECTAL
Status: DISCONTINUED | OUTPATIENT
Start: 2019-05-23 | End: 2019-05-25

## 2019-05-23 RX ORDER — HYDROMORPHONE HYDROCHLORIDE 1 MG/ML
0.4 INJECTION, SOLUTION INTRAMUSCULAR; INTRAVENOUS; SUBCUTANEOUS EVERY 5 MIN PRN
Status: DISCONTINUED | OUTPATIENT
Start: 2019-05-23 | End: 2019-05-23 | Stop reason: HOSPADM

## 2019-05-23 RX ORDER — ACETAMINOPHEN 500 MG
500 TABLET ORAL EVERY 6 HOURS PRN
Status: ON HOLD | COMMUNITY
End: 2019-05-25

## 2019-05-23 RX ORDER — ACETAMINOPHEN 325 MG/1
650 TABLET ORAL 4 TIMES DAILY
Status: COMPLETED | OUTPATIENT
Start: 2019-05-23 | End: 2019-05-24

## 2019-05-23 NOTE — PROGRESS NOTES
NURSING ADMISSION NOTE      Patient admitted via bed from PACU s/p right total hip replacement. Oriented to room. Safety precautions initiated. Bed in low position. Call light in reach. Brother Raudel at bedside. Pt denies pain at this time.   Still num

## 2019-05-23 NOTE — CONSULTS
[facility]  [address] Patient Status:  Inpatient    1965 MRN [id number]   [facility] 3SW-A Attending Jo Madrid MD   Hosp Day # 0 PCP Finn Handy MD     Reason for consult: Medical management systems was completed. Pertinent positives and negatives noted in the HPI.     Physical Exam:    /90 (BP Location: Right arm)   Pulse 69   Temp 98.3 °F (36.8 °C) (Oral)   Resp 20   Ht 5' 10\" (1.778 m)   Wt 282 lb 10.1 oz (128.2 kg)   SpO2 100%   B

## 2019-05-23 NOTE — PLAN OF CARE
Pedal pulses strong, decreased sensation and numbness to R leg. Denies pain. Aquacel and gel ice to site. VSS, tolerating RA. Placed on tele per NANDO protocol, NSR. Mask and tubing at bedside. DTV by 1600. PT/OT to see. Will monitor.

## 2019-05-23 NOTE — ANESTHESIA POSTPROCEDURE EVALUATION
Nura Murphy 80 Patient Status:  Surgery Admit - Inpt   Age/Gender 48year old male MRN [id number]   [place] Attending Jackie Bo MD   Hosp Day # 0 PCP Perfecto Mclain MD       Anesthesia Post-op Note    P

## 2019-05-23 NOTE — INTERVAL H&P NOTE
Pre-op Diagnosis: Primary osteoarthritis of right hip [M16.11]    The above referenced H&P was reviewed by Isabelle Chris MD on 5/23/2019, the patient was examined and no significant changes have occurred in the patient's condition since the H&P was perf

## 2019-05-23 NOTE — H&P
Lilyan Meckel   5/2/2019 8:20 AM   Office Visit   MRN:  [id number]   Description: 48year old male Provider: Joni Beebe MD Department: [facility] [place]     Scanning Cover Sheet     Click to print [employer] [address] [address]52 for scanning     Offic Meloxicam 15 MG Oral Tab Take 1 tablet (15 mg total) by mouth daily as needed for Pain.  Disp: 90 tablet Rfl: 1   traMADol HCl 50 MG Oral Tab 1 tab po q 6 hrs prn pain Disp: 120 tablet Rfl: 5   Multiple Vitamin (ONE-A-DAY MENS) Oral Tab Take 1 tablet by marilin    1. Pre-op exam     Jen Rodríguez has no significant history of cardiac or pulmonary conditions. Pt has a history of hypertension, currnetly with no meds the past few months, appears normotensive with no meds.    Stress test and echo normal done 1/2019

## 2019-05-23 NOTE — BRIEF OP NOTE
Pre-Operative Diagnosis: Primary osteoarthritis of right hip [M16.11]     Post-Operative Diagnosis: Primary osteoarthritis of right hip [M16.11]      Procedure Performed:   Procedure(s):  RIGHT TOTAL HIP ARTHROPLASTY    Surgeon(s) and Role:     Ray Alvarez,

## 2019-05-23 NOTE — ANESTHESIA PREPROCEDURE EVALUATION
PRE-OP EVALUATION    Patient Name: Jen Rodríguez    Pre-op Diagnosis: Primary osteoarthritis of right hip [M16.11]    Procedure(s):  RIGHT TOTAL HIP ARTHROPLASTY    Surgeon(s) and Role:     Venice Lorenz MD - Primary    Pre-op vitals reviewed.   Temp Left      Social History    Tobacco Use      Smoking status: Never Smoker      Smokeless tobacco: Never Used    Alcohol use: No      Alcohol/week: 0.0 oz      Drug use: No     Available pre-op labs reviewed.   Lab Results   Component Value Date    WBC 5.9 0

## 2019-05-23 NOTE — OPERATIVE REPORT
PATIENT'S NAME: Lilyan Meckel   ATTENDING PHYSICIAN: Deven Gates MD   OPERATING PHYSICIAN: Deven Gates MD   PATIENT ACCOUNT#:   [de-identified]    LOCATION:  [address] [place] [address],[address] RECORD #:   [id number]    YOB: 1965  ADMISSI trochanter and down in line with the vastus lateralis.   The tendon was subperiosteally elevated off the anterior aspect of the greater trochanter down to  capsule, which was incised in a T-type fashion, partially excising the capsule to gain access to the cleaning the [facility] FOR CONTINUING [facility], the final head was impacted in place with good fixation. The final reduction was performed and the hip was put through good range of motion with good leg length and offset and no instability. The pin was removed from the pelvis.

## 2019-05-23 NOTE — PHYSICAL THERAPY NOTE
PHYSICAL THERAPY HIP EVALUATION - INPATIENT     Room Number: 351/351-A  Evaluation Date: 5/23/2019  Type of Evaluation: Initial  Physician Order: PT Eval and Treat    Presenting Problem: S/p Right SYED on 05/23/19  Reason for Therapy: Mobility Dysfunction a risk    WEIGHT BEARING RESTRICTION  Weight Bearing Restriction: R lower extremity        R Lower Extremity: Weight Bearing as Tolerated       PAIN ASSESSMENT  Ratin  Location: Right hip @ surgical site  Management Techniques: Activity promotion; Body me lightheaded & felt like passing out - each time when attempted  Assistive Device: Rolling walker  Pattern: (Was able to stand with limited weight bearing on right LE)  Stoop/Curb Assistance: Not tested  Comment : Above score is based on FIM definations following impairments 1) decreased ROM & strength in right hip,2) pain in right hip @ surgical site,3) fair awareness re: anterior hip precautions,4) decreased balance & endurance. Functional outcome measures completed include AM PAC scores.   Based on thi

## 2019-05-24 RX ORDER — HYDROCODONE BITARTRATE AND ACETAMINOPHEN 10; 325 MG/1; MG/1
1 TABLET ORAL EVERY 4 HOURS PRN
Status: DISCONTINUED | OUTPATIENT
Start: 2019-05-25 | End: 2019-05-25

## 2019-05-24 RX ORDER — HYDROCODONE BITARTRATE AND ACETAMINOPHEN 10; 325 MG/1; MG/1
2 TABLET ORAL EVERY 4 HOURS PRN
Status: DISCONTINUED | OUTPATIENT
Start: 2019-05-25 | End: 2019-05-25

## 2019-05-24 NOTE — PLAN OF CARE
Pain relieved by norco, pt denies numbness/tingling. Aquacel CDI to R hip. VSS, tolerating RA. CPAP at night for NANDO. Tele monitoring, NSR. SCDs on. D/c planning in place, home with [facility] when cleared. Will monitor.

## 2019-05-24 NOTE — PLAN OF CARE
Patient received AAOX4. Pain is moderate, tolerating oral pain meds PRN. He denies any numbness and or tingling for BLE. Pain management plan explained. IS encouraged. NANDO with CPAP, Tele per protocol - NSR. RA while awake. Voids adequate amount.  He got up

## 2019-05-24 NOTE — PAYOR COMM NOTE
--------------  ADMISSION REVIEW     Payor: [employer]  Subscriber #:  [id number]  Authorization Number: [id number]    Admit date: 5/23/19  Admit time: 9849       Admitting Physician: Hernan Pena MD  Attending Physician:  Hernan Pena MD  Bonsallar incision across the anterior aspect of the greater trochanter and down in line with the vastus lateralis.  The tendon was subperiosteally elevated off the anterior aspect of the greater trochanter down to  capsule, which was incised in a T-type fashion, pa instability.  The trial head was removed.  After cleaning the Almazan taper, the final head was impacted in place with good fixation.  The final reduction was performed and the hip was put through good range of motion with good leg length and offset and no i

## 2019-05-24 NOTE — RESPIRATORY THERAPY NOTE
NANDO - Equipment Use Daily Summary:  · Set Mode AFLEX  · Usage in hours: 9:00  · 90% Pressure (EPAP) level: 8.0  · 90% Insp Pressure (IPAP):   · AHI: 0.6  · Supplemental Oxygen:   · Comments:

## 2019-05-24 NOTE — PHYSICAL THERAPY NOTE
PHYSICAL THERAPY HIP TREATMENT NOTE - INPATIENT      Room Number: 684/663-Z     Session: 2and3  Number of Visits to Meet Established Goals: 4    Presenting Problem: S/p Right SYED on 05/23/19    Problem List  Active Problems:    NANDO (obstructive sleep apnea help from another person does the patient currently need. ..   -   Moving to and from a bed to a chair (including a wheelchair)?: None   -   Need to walk in hospital room?: A Little   -   Climbing 3-5 steps with a railing?: A Little       AM-PAC Score:  Raw 15 reps         Comments:  Patient participated fairly well. Limited ambulatory distance due to pain,  Patient End of Session: Up in chair;Needs met;Call light within reach;RN aware of session/findings; All patient questions and concerns addressed;SCDs in p Patient verbalizes and/or demonstrates all precautions and safety concerns independently   Goal #6           Goal Comments: Goals established on 5/23/2019,Ongoing 05/24/19

## 2019-05-24 NOTE — PROGRESS NOTES
[facility]  Orthopedic Surgery  Progress Note    Cammie Arguello Patient Status:  Inpatient    1965 MRN [id number]   [facility] 3SW-A Attending Charles Del Rosario MD   Hosp Day # 1 PCP Agus Bello MD     SUBJECTIVE:  INT

## 2019-05-24 NOTE — HOME CARE LIAISON
MET WITH PTNT AND OFFERED CHOICE  OF AGENCIES. PTNT AGREEABLE TO [facility]. MET WITH PTNT TO DISCUSS HOME HEALTH SERVICES AND COVERAGE CRITERIA. PTNT AGREEABLE TO [facility]. PTNT GIVEN RESIDENTIAL BROCHURE.  RESIDENTIAL WITH PROVIDE SN/PT ON DISC

## 2019-05-24 NOTE — PHYSICAL THERAPY NOTE
PHYSICAL THERAPY HIP TREATMENT NOTE - INPATIENT      Room Number: 781/790-S     Session: 1  Number of Visits to Meet Established Goals: 4    Presenting Problem: S/p Right SYED on 05/23/19    Problem List  Active Problems:    NANDO (obstructive sleep apnea) bedside commode, etc.): A Little   -   Moving from lying on back to sitting on the side of the bed?: A Little   How much help from another person does the patient currently need. ..   -   Moving to and from a bed to a chair (including a wheelchair)?: A [place] include Sleep apnea, HL,HTN, Left SYED on 02/07/19. Patient demonstrated progressing functional mobility. Pain limited his ability to perform exercises as per SYED rehab protocol.   Patient was able to recall SYED anterior precautions with occasional assist. Pa

## 2019-05-24 NOTE — OCCUPATIONAL THERAPY NOTE
OCCUPATIONAL THERAPY QUICK EVALUATION - INPATIENT    Room Number: 351/351-A  Evaluation Date: 5/24/2019     Type of Evaluation: Quick Eval  Presenting Problem: s/p R SYED 5/23/19    Physician Order: IP Consult to Occupational Therapy  Reason for Therapy:  A risk    WEIGHT BEARING RESTRICTION  Weight Bearing Restriction: R lower extremity        R Lower Extremity: Weight Bearing as Tolerated       PAIN ASSESSMENT  Ratin  Location: R hip  Management Techniques: Activity promotion; Body mechanics;Breathing te education on toileting and toilet transfer techniques, simulated transfer with SBA to various surfaces (reports has 3in1 commode at home).  Patient also educated on OT role, safety, fall prevention, pain management, shower transfers, car transfers with good reports will have supervision at home  Patient able to dress lower extremities:  At supervision level or above; patient reports will have supervision at home  Patient/Caregiver able to demonstrate safety with ADLS: At supervision level or above; patient rep

## 2019-05-25 VITALS
TEMPERATURE: 99 F | RESPIRATION RATE: 20 BRPM | WEIGHT: 282.63 LBS | BODY MASS INDEX: 40.46 KG/M2 | OXYGEN SATURATION: 100 % | DIASTOLIC BLOOD PRESSURE: 46 MMHG | HEIGHT: 70 IN | HEART RATE: 86 BPM | SYSTOLIC BLOOD PRESSURE: 112 MMHG

## 2019-05-25 RX ORDER — POLYETHYLENE GLYCOL 3350 17 G/17G
17 POWDER, FOR SOLUTION ORAL DAILY PRN
Qty: 10 EACH | Refills: 0 | Status: SHIPPED | COMMUNITY
Start: 2019-05-25 | End: 2019-06-20 | Stop reason: ALTCHOICE

## 2019-05-25 NOTE — PLAN OF CARE
Pt A&O. On room air. Wearing cpap when sleeping. Tele and  monitoring maintained. Encouraged use of incentive spirometer and ankle pump exercises every hour while awake. Scds to BLE. Tubi- applied to RLE. Tolerating diet with good appetite.  Last BM

## 2019-05-25 NOTE — PROGRESS NOTES
Acute Pain Service    Post Op Day 2 Ortho Note    Assessed patient in bed. Rates pain 3-4/10 at rest and 5-6/10 with activity. Outbrain is working well to manage pain; denies itching/nausea/dizziness.     Able to bear weight on sx leg; equal sensation i

## 2019-05-25 NOTE — PROGRESS NOTES
Nura Murphy 80 Patient Status:  Inpatient    1965 MRN [id number]   [facility] 3SW-A Attending Luke aGrvin MD   Hosp Day # 2 PCP Caroline Rodriguez MD         S:  Patient doing well. No nausea.   No SOB, CP or ca

## 2019-05-25 NOTE — PLAN OF CARE
Problem: PAIN - ADULT  Goal: Verbalizes/displays adequate comfort level or patient's stated pain goal  Description  INTERVENTIONS:  - Encourage pt to monitor pain and request assistance  - Assess pain using appropriate pain scale  - Administer analgesics Term Goal: be able to increase activity,and participate in exercises    Interventions:  - Pain meds PRN  - PT/OT,comply with plan of care  - See additional Care Plan goals for specific interventions    5/25/2019 0600 by Maria C Chun RN  Outcome: Progre or tenderness,reinforced IS and foot and ankle pump exercises. Safety measures reinforced,call light kept within reach.

## 2019-05-25 NOTE — PHYSICAL THERAPY NOTE
PHYSICAL THERAPY HIP TREATMENT NOTE - INPATIENT      Room Number: 577/850-V     Session: 4  Number of Visits to Meet Established Goals: 4    Presenting Problem: S/p Right SYED on 05/23/19  History related to current admission: Admitted S/p Right SYED on 05/2 etc.): None   -   Moving from lying on back to sitting on the side of the bed?: A Little   How much help from another person does the patient currently need. ..   -   Moving to and from a bed to a chair (including a wheelchair)?: A Little   -   Need to walk light within reach;RN aware of session/findings; All patient questions and concerns addressed; Ice applied    ASSESSMENT   Pt seen BID in PT group for gait training, stair/curb step training, and BLE strengthening: S/P R SYED.     Results of the AM-PAC \"6 cli

## 2019-05-25 NOTE — RESPIRATORY THERAPY NOTE
NANDO : EQUIPMENT USE: DAILY SUMMARY                                            SET MODE: AUTO CPAP WITH CFLEX                                          USAGE IN HOURS: 9:24                                          90

## 2019-05-27 NOTE — DISCHARGE SUMMARY
Patient ID:  Edwige Barajas  [id number]  48year old  12/19/1965    Admit Date: 5/23/2019    Discharge Date and Time: 5/25/2019     Attending Physician: Antelmo Hurd MD    Reason for admission: Primary osteoarthritis of right hip [M16.11]    Discharge Tab        Follow-up with Argelia Seals MD in 2 weeks.      Magda Chisholm MD  5/27/2019  4:40 PM

## 2019-06-12 ENCOUNTER — PATIENT OUTREACH (OUTPATIENT)
Dept: CASE MANAGEMENT | Age: 54
End: 2019-06-12

## 2019-06-20 ENCOUNTER — TELEPHONE (OUTPATIENT)
Dept: FAMILY MEDICINE CLINIC | Facility: CLINIC | Age: 54
End: 2019-06-20

## 2019-06-20 ENCOUNTER — OFFICE VISIT (OUTPATIENT)
Dept: FAMILY MEDICINE CLINIC | Facility: CLINIC | Age: 54
End: 2019-06-20
Payer: COMMERCIAL

## 2019-06-20 VITALS
OXYGEN SATURATION: 99 % | HEIGHT: 70 IN | WEIGHT: 279 LBS | RESPIRATION RATE: 15 BRPM | BODY MASS INDEX: 39.94 KG/M2 | HEART RATE: 104 BPM | SYSTOLIC BLOOD PRESSURE: 124 MMHG | DIASTOLIC BLOOD PRESSURE: 82 MMHG

## 2019-06-20 DIAGNOSIS — Z12.11 SCREENING FOR COLON CANCER: ICD-10-CM

## 2019-06-20 DIAGNOSIS — Z13.29 SCREENING FOR ENDOCRINE, NUTRITIONAL, METABOLIC AND IMMUNITY DISORDER: ICD-10-CM

## 2019-06-20 DIAGNOSIS — G47.33 OSA ON CPAP: ICD-10-CM

## 2019-06-20 DIAGNOSIS — Z99.89 OSA ON CPAP: ICD-10-CM

## 2019-06-20 DIAGNOSIS — Z96.643 STATUS POST BILATERAL TOTAL HIP REPLACEMENT: Primary | ICD-10-CM

## 2019-06-20 DIAGNOSIS — E66.9 OBESITY (BMI 30-39.9): ICD-10-CM

## 2019-06-20 DIAGNOSIS — Z13.21 SCREENING FOR ENDOCRINE, NUTRITIONAL, METABOLIC AND IMMUNITY DISORDER: ICD-10-CM

## 2019-06-20 DIAGNOSIS — Z13.228 SCREENING FOR ENDOCRINE, NUTRITIONAL, METABOLIC AND IMMUNITY DISORDER: ICD-10-CM

## 2019-06-20 DIAGNOSIS — Z13.0 SCREENING FOR ENDOCRINE, NUTRITIONAL, METABOLIC AND IMMUNITY DISORDER: ICD-10-CM

## 2019-06-20 DIAGNOSIS — Z12.5 PROSTATE CANCER SCREENING: ICD-10-CM

## 2019-06-20 PROCEDURE — 99214 OFFICE O/P EST MOD 30 MIN: CPT | Performed by: EMERGENCY MEDICINE

## 2019-06-20 NOTE — PATIENT INSTRUCTIONS
Thank you for choosing [facility]  To Do:  FOR [address]    · Continue with physical therapy  · Continue with weight loss with weight loss clinic  · Arrange for colonoscopy, Dr. Wilfred Wiseman  · Follow up in 3 months for annual physical diseases  · Certain cancers  You may begin your treatment with your primary healthcare provider. But if you need more help, you may want to see a bariatric healthcare provider. He or she may have new ideas or methods for weight loss that can help you.  Some blood tests  · Kidney function blood tests  · Vitamin D levels  · Electrocardiogram, to look at your heart rhythm  · Exercise testing, to see how well your heart works during exercise  · Resting metabolic rate, to look at how many calories you burn at rest

## 2019-06-20 NOTE — PROGRESS NOTES
Chief Complaint:   Patient presents with:  Post-Op: F/u RT hip surgery     HPI:   This is a 48year old male         HIP SURGERY FOLLOW UP  S/P right hip replacement 4 weeks ago. S/P L hip replacement the past 4 months ago. Feels well.    Currently in  daily. Disp: 60 tablet Rfl: 1     No current facility-administered medications on file prior to visit.     Counseling given: Not Answered         PROBLEM LIST     Patient Active Problem List:     NANDO on CPAP     Restless legs     Hyperlipidemia     Elevated PLAN:         1. Status post bilateral total hip replacement  Stable, continue follow up with ortho  Continue Physical therapy    2. Obesity (BMI 30-39. 9)  Continue with aggressive weight loss  continue care with [facility]  - TSH W REFLEX TO FREE T4; Future    3

## 2019-06-20 NOTE — TELEPHONE ENCOUNTER
Pt was called and a voice message was left. Parking placard is ready for . Original form was placed up front and copy was sent to scan.

## 2019-06-21 PROBLEM — Z47.89 ORTHOPEDIC AFTERCARE: Status: RESOLVED | Noted: 2019-02-08 | Resolved: 2019-06-21

## 2019-06-26 ENCOUNTER — OFFICE VISIT (OUTPATIENT)
Dept: INTERNAL MEDICINE CLINIC | Facility: CLINIC | Age: 54
End: 2019-06-26
Payer: COMMERCIAL

## 2019-06-26 VITALS
HEIGHT: 70 IN | DIASTOLIC BLOOD PRESSURE: 78 MMHG | HEART RATE: 74 BPM | RESPIRATION RATE: 16 BRPM | WEIGHT: 274 LBS | SYSTOLIC BLOOD PRESSURE: 122 MMHG | BODY MASS INDEX: 39.22 KG/M2

## 2019-06-26 DIAGNOSIS — Z96.641 STATUS POST TOTAL REPLACEMENT OF RIGHT HIP: ICD-10-CM

## 2019-06-26 DIAGNOSIS — E66.9 OBESITY (BMI 30-39.9): ICD-10-CM

## 2019-06-26 DIAGNOSIS — E78.5 DYSLIPIDEMIA: ICD-10-CM

## 2019-06-26 DIAGNOSIS — Z96.642 STATUS POST TOTAL REPLACEMENT OF LEFT HIP: ICD-10-CM

## 2019-06-26 DIAGNOSIS — Z51.81 THERAPEUTIC DRUG MONITORING: Primary | ICD-10-CM

## 2019-06-26 PROCEDURE — 99214 OFFICE O/P EST MOD 30 MIN: CPT | Performed by: INTERNAL MEDICINE

## 2019-06-26 RX ORDER — TOPIRAMATE 50 MG/1
50 TABLET, FILM COATED ORAL 2 TIMES DAILY
Qty: 60 TABLET | Refills: 2 | Status: SHIPPED | OUTPATIENT
Start: 2019-06-26 | End: 2019-07-29

## 2019-06-26 RX ORDER — TOPIRAMATE 50 MG/1
50 TABLET, FILM COATED ORAL 2 TIMES DAILY
COMMUNITY
End: 2019-06-26

## 2019-06-26 NOTE — PROGRESS NOTES
HISTORY OF PRESENT ILLNESS  Patient presents with:  Weight Check: up 3 lb      Yvonne Parra is a 48year old male here for follow up in medical weight loss program.     Denies chest pain, shortness of breath, dizziness, blurred vision, headache, paresthe 05/24/2019    BUNCREA 26.1 (H) 05/24/2019    CREATSERUM 0.69 (L) 05/24/2019    ANIONGAP 4 05/24/2019     11/21/2014    GFRNAA 110 05/24/2019    GFRAA 127 05/24/2019    CA 8.4 (L) 05/24/2019    OSMOCALC 293 05/24/2019    ALKPHO 30 (L) 01/17/2019    A Take 1 tablet (50 mg total) by mouth 2 (two) times daily.         PLAN  · Weight max: 2/2/18 at 408 lbs   · Up 3 lb  · Lost 134  lbs thus far   · Continue topamax to 50 mg bid  · We reviewed weight gain 2/2 to falling off plan and stress eating    · Reviewe

## 2019-07-16 ENCOUNTER — OFFICE VISIT (OUTPATIENT)
Dept: SURGERY | Facility: CLINIC | Age: 54
End: 2019-07-16

## 2019-07-16 VITALS
DIASTOLIC BLOOD PRESSURE: 81 MMHG | HEART RATE: 99 BPM | SYSTOLIC BLOOD PRESSURE: 134 MMHG | WEIGHT: 274 LBS | HEIGHT: 70 IN | BODY MASS INDEX: 39.22 KG/M2

## 2019-07-16 DIAGNOSIS — Z12.11 ENCOUNTER FOR SCREENING COLONOSCOPY: Primary | ICD-10-CM

## 2019-07-16 DIAGNOSIS — Z96.642 STATUS POST TOTAL REPLACEMENT OF LEFT HIP: ICD-10-CM

## 2019-07-16 DIAGNOSIS — E78.5 HYPERLIPIDEMIA, UNSPECIFIED HYPERLIPIDEMIA TYPE: ICD-10-CM

## 2019-07-16 DIAGNOSIS — Z99.89 OSA ON CPAP: ICD-10-CM

## 2019-07-16 DIAGNOSIS — G47.33 OSA ON CPAP: ICD-10-CM

## 2019-07-16 DIAGNOSIS — Z96.641 STATUS POST TOTAL REPLACEMENT OF RIGHT HIP: ICD-10-CM

## 2019-07-16 DIAGNOSIS — E66.01 CLASS 3 SEVERE OBESITY DUE TO EXCESS CALORIES WITHOUT SERIOUS COMORBIDITY WITH BODY MASS INDEX (BMI) OF 40.0 TO 44.9 IN ADULT (HCC): ICD-10-CM

## 2019-07-16 PROCEDURE — S0285 CNSLT BEFORE SCREEN COLONOSC: HCPCS | Performed by: COLON & RECTAL SURGERY

## 2019-07-16 RX ORDER — POLYETHYLENE GLYCOL 3350, SODIUM CHLORIDE, SODIUM BICARBONATE, POTASSIUM CHLORIDE 420; 11.2; 5.72; 1.48 G/4L; G/4L; G/4L; G/4L
POWDER, FOR SOLUTION ORAL
Qty: 1 BOTTLE | Refills: 0 | Status: SHIPPED | OUTPATIENT
Start: 2019-07-16 | End: 2019-08-26

## 2019-07-16 NOTE — H&P
New Patient Visit Note       Active Problems      1. Encounter for screening colonoscopy    2. Status post total replacement of right hip    3. Status post total replacement of left hip    4.  NANDO on CPAP        Chief Complaint   Patient presents with:  Col apnea and utilizes a CPAP. The patient has undergone bilateral total hip replacement earlier this year with Dr. Maria C Cantu. The left was performed in February and the right was performed in May.   The patient states that he is recovering well following his Socioeconomic History      Marital status: Single      Spouse name: Not on file      Number of children: Not on file      Years of education: Not on file      Highest education level: Not on file    Tobacco Use      Smoking status: Never Smoker      Smokel Negative for tremors, syncope and weakness. Hematological: Negative for adenopathy. Does not bruise/bleed easily. Psychiatric/Behavioral: Negative for behavioral problems and sleep disturbance.        Physical Findings   /81   Pulse 99   Ht 70\" constipation. He denies any family history of colon cancer or colon polyps. The patient's past medical history consists of obesity, he has recently lost nearly 200 pounds. The patient also has obstructive sleep apnea and utilizes a CPAP.   The patient h

## 2019-07-16 NOTE — PATIENT INSTRUCTIONS
This patient presents the referral of his primary care physician, Dr. Konrad Pink, for colonoscopy consultation. The patient has never undergone a prior colonoscopy.   He denies any current symptoms such as black or tarry stools, bright red blood per rectum,

## 2019-07-29 ENCOUNTER — OFFICE VISIT (OUTPATIENT)
Dept: INTERNAL MEDICINE CLINIC | Facility: CLINIC | Age: 54
End: 2019-07-29
Payer: COMMERCIAL

## 2019-07-29 VITALS
SYSTOLIC BLOOD PRESSURE: 130 MMHG | HEART RATE: 88 BPM | DIASTOLIC BLOOD PRESSURE: 78 MMHG | WEIGHT: 275 LBS | HEIGHT: 70 IN | BODY MASS INDEX: 39.37 KG/M2 | RESPIRATION RATE: 16 BRPM

## 2019-07-29 DIAGNOSIS — Z96.641 STATUS POST TOTAL REPLACEMENT OF RIGHT HIP: ICD-10-CM

## 2019-07-29 DIAGNOSIS — E66.9 OBESITY (BMI 30-39.9): ICD-10-CM

## 2019-07-29 DIAGNOSIS — Z96.642 STATUS POST TOTAL REPLACEMENT OF LEFT HIP: ICD-10-CM

## 2019-07-29 DIAGNOSIS — Z51.81 THERAPEUTIC DRUG MONITORING: Primary | ICD-10-CM

## 2019-07-29 PROCEDURE — 99214 OFFICE O/P EST MOD 30 MIN: CPT | Performed by: INTERNAL MEDICINE

## 2019-07-29 RX ORDER — TOPIRAMATE 50 MG/1
50 TABLET, FILM COATED ORAL 2 TIMES DAILY
Qty: 60 TABLET | Refills: 2 | Status: SHIPPED | OUTPATIENT
Start: 2019-07-29 | End: 2019-08-26

## 2019-07-29 RX ORDER — PHENTERMINE HYDROCHLORIDE 15 MG/1
15 CAPSULE ORAL EVERY MORNING
Qty: 30 CAPSULE | Refills: 0 | Status: SHIPPED | OUTPATIENT
Start: 2019-07-29 | End: 2019-08-26

## 2019-07-29 NOTE — PROGRESS NOTES
HISTORY OF PRESENT ILLNESS  Patient presents with:  Weight Check: up 1 lb      Jerzy Paris is a 48year old male here for follow up in medical weight loss program.     Denies chest pain, shortness of breath, dizziness, blurred vision, headache, paresthe 01/17/2019    ALB 4.1 01/17/2019    GLOBULIN 3.5 01/17/2019    AGRATIO 1.8 11/21/2014     05/24/2019    K 3.5 05/24/2019     05/24/2019    CO2 27.0 05/24/2019     Lab Results   Component Value Date     03/27/2018    A1C 5.9 (H) 03/27/201 Take 1 tablet (50 mg total) by mouth 2 (two) times daily.  -     Phentermine HCl 15 MG Oral Cap; Take 1 capsule (15 mg total) by mouth every morning.         PLAN  · Weight max: 2/2/18 at 408 lbs   · UP 1 lb  · Lost 133  lbs thus far   · Continue topamax to

## 2019-07-30 NOTE — PROGRESS NOTES
Multiple attempts made to reach the pt and messages left with no returned phone call. Closing encounter.

## 2019-08-19 ENCOUNTER — MED REC SCAN ONLY (OUTPATIENT)
Dept: SURGERY | Facility: CLINIC | Age: 54
End: 2019-08-19

## 2019-08-26 ENCOUNTER — OFFICE VISIT (OUTPATIENT)
Dept: INTERNAL MEDICINE CLINIC | Facility: CLINIC | Age: 54
End: 2019-08-26
Payer: COMMERCIAL

## 2019-08-26 VITALS
WEIGHT: 265 LBS | SYSTOLIC BLOOD PRESSURE: 116 MMHG | HEIGHT: 70 IN | RESPIRATION RATE: 16 BRPM | HEART RATE: 104 BPM | BODY MASS INDEX: 37.94 KG/M2 | DIASTOLIC BLOOD PRESSURE: 82 MMHG

## 2019-08-26 DIAGNOSIS — E66.9 OBESITY, CLASS II, BMI 35-39.9: ICD-10-CM

## 2019-08-26 DIAGNOSIS — Z96.642 STATUS POST TOTAL REPLACEMENT OF LEFT HIP: ICD-10-CM

## 2019-08-26 DIAGNOSIS — G47.33 OSA ON CPAP: ICD-10-CM

## 2019-08-26 DIAGNOSIS — Z99.89 OSA ON CPAP: ICD-10-CM

## 2019-08-26 DIAGNOSIS — Z51.81 THERAPEUTIC DRUG MONITORING: Primary | ICD-10-CM

## 2019-08-26 DIAGNOSIS — Z96.641 STATUS POST TOTAL REPLACEMENT OF RIGHT HIP: ICD-10-CM

## 2019-08-26 PROCEDURE — 99214 OFFICE O/P EST MOD 30 MIN: CPT | Performed by: INTERNAL MEDICINE

## 2019-08-26 RX ORDER — PHENTERMINE HYDROCHLORIDE 15 MG/1
15 CAPSULE ORAL EVERY MORNING
Qty: 30 CAPSULE | Refills: 1 | Status: SHIPPED | OUTPATIENT
Start: 2019-08-26 | End: 2019-10-21

## 2019-08-26 RX ORDER — TOPIRAMATE 50 MG/1
50 TABLET, FILM COATED ORAL 2 TIMES DAILY
Qty: 60 TABLET | Refills: 2 | Status: SHIPPED | OUTPATIENT
Start: 2019-08-26 | End: 2019-10-21

## 2019-08-26 NOTE — PROGRESS NOTES
HISTORY OF PRESENT ILLNESS  Patient presents with:  Weight Check: lost 10 pounds      Amalia Rosa is a 48year old male here for follow up in medical weight loss program.     Denies chest pain, shortness of breath, dizziness, blurred vision, headache, p CREATSERUM 0.69 (L) 05/24/2019    ANIONGAP 4 05/24/2019     11/21/2014    GFRNAA 110 05/24/2019    GFRAA 127 05/24/2019    CA 8.4 (L) 05/24/2019    OSMOCALC 293 05/24/2019    ALKPHO 30 (L) 01/17/2019    AST 26 01/17/2019    ALT 45 01/17/2019    BILT capsule (15 mg total) by mouth every morning.  -     topiramate 50 MG Oral Tab; Take 1 tablet (50 mg total) by mouth 2 (two) times daily.         PLAN  · Weight max: 2/2/18 at 408 lbs   · Down 10 lb  · Lost 143  lbs thus far   · Significant improvement in e

## 2019-09-20 ENCOUNTER — OFFICE VISIT (OUTPATIENT)
Dept: FAMILY MEDICINE CLINIC | Facility: CLINIC | Age: 54
End: 2019-09-20
Payer: COMMERCIAL

## 2019-09-20 ENCOUNTER — APPOINTMENT (OUTPATIENT)
Dept: LAB | Age: 54
End: 2019-09-20
Attending: EMERGENCY MEDICINE
Payer: COMMERCIAL

## 2019-09-20 VITALS
SYSTOLIC BLOOD PRESSURE: 126 MMHG | BODY MASS INDEX: 39.22 KG/M2 | HEART RATE: 101 BPM | HEIGHT: 70 IN | WEIGHT: 274 LBS | TEMPERATURE: 98 F | OXYGEN SATURATION: 97 % | RESPIRATION RATE: 16 BRPM | DIASTOLIC BLOOD PRESSURE: 80 MMHG

## 2019-09-20 DIAGNOSIS — Z00.00 ENCOUNTER FOR ANNUAL PHYSICAL EXAM: Primary | ICD-10-CM

## 2019-09-20 DIAGNOSIS — Z12.5 PROSTATE CANCER SCREENING: ICD-10-CM

## 2019-09-20 DIAGNOSIS — E66.9 OBESITY (BMI 30-39.9): ICD-10-CM

## 2019-09-20 DIAGNOSIS — Z23 NEED FOR VACCINATION: ICD-10-CM

## 2019-09-20 DIAGNOSIS — Z13.21 SCREENING FOR ENDOCRINE, NUTRITIONAL, METABOLIC AND IMMUNITY DISORDER: ICD-10-CM

## 2019-09-20 DIAGNOSIS — E55.9 VITAMIN D DEFICIENCY: ICD-10-CM

## 2019-09-20 DIAGNOSIS — Z13.0 SCREENING FOR ENDOCRINE, NUTRITIONAL, METABOLIC AND IMMUNITY DISORDER: ICD-10-CM

## 2019-09-20 DIAGNOSIS — G47.33 OSA ON CPAP: ICD-10-CM

## 2019-09-20 DIAGNOSIS — Z23 NEEDS FLU SHOT: ICD-10-CM

## 2019-09-20 DIAGNOSIS — Z99.89 OSA ON CPAP: ICD-10-CM

## 2019-09-20 DIAGNOSIS — Z13.228 SCREENING FOR ENDOCRINE, NUTRITIONAL, METABOLIC AND IMMUNITY DISORDER: ICD-10-CM

## 2019-09-20 DIAGNOSIS — Z13.29 SCREENING FOR ENDOCRINE, NUTRITIONAL, METABOLIC AND IMMUNITY DISORDER: ICD-10-CM

## 2019-09-20 LAB
ALBUMIN SERPL-MCNC: 4 G/DL (ref 3.4–5)
ALBUMIN/GLOB SERPL: 1.1 {RATIO} (ref 1–2)
ALP LIVER SERPL-CCNC: 44 U/L (ref 45–117)
ALT SERPL-CCNC: 20 U/L (ref 16–61)
ANION GAP SERPL CALC-SCNC: 4 MMOL/L (ref 0–18)
AST SERPL-CCNC: 14 U/L (ref 15–37)
BILIRUB SERPL-MCNC: 0.8 MG/DL (ref 0.1–2)
BUN BLD-MCNC: 16 MG/DL (ref 7–18)
BUN/CREAT SERPL: 14.4 (ref 10–20)
CALCIUM BLD-MCNC: 9.5 MG/DL (ref 8.5–10.1)
CHLORIDE SERPL-SCNC: 108 MMOL/L (ref 98–112)
CHOLEST SMN-MCNC: 173 MG/DL (ref ?–200)
CO2 SERPL-SCNC: 28 MMOL/L (ref 21–32)
COMPLEXED PSA SERPL-MCNC: 2.26 NG/ML (ref ?–4)
CREAT BLD-MCNC: 1.11 MG/DL (ref 0.7–1.3)
GLOBULIN PLAS-MCNC: 3.6 G/DL (ref 2.8–4.4)
GLUCOSE BLD-MCNC: 88 MG/DL (ref 70–99)
HDLC SERPL-MCNC: 62 MG/DL (ref 40–59)
LDLC SERPL CALC-MCNC: 103 MG/DL (ref ?–100)
M PROTEIN MFR SERPL ELPH: 7.6 G/DL (ref 6.4–8.2)
NONHDLC SERPL-MCNC: 111 MG/DL (ref ?–130)
OSMOLALITY SERPL CALC.SUM OF ELEC: 291 MOSM/KG (ref 275–295)
POTASSIUM SERPL-SCNC: 3.8 MMOL/L (ref 3.5–5.1)
SODIUM SERPL-SCNC: 140 MMOL/L (ref 136–145)
TRIGL SERPL-MCNC: 41 MG/DL (ref 30–149)
TSI SER-ACNC: 1.45 MIU/ML (ref 0.36–3.74)
VIT D+METAB SERPL-MCNC: 34 NG/ML (ref 30–100)
VLDLC SERPL CALC-MCNC: 8 MG/DL (ref 0–30)

## 2019-09-20 PROCEDURE — 84443 ASSAY THYROID STIM HORMONE: CPT | Performed by: EMERGENCY MEDICINE

## 2019-09-20 PROCEDURE — 84153 ASSAY OF PSA TOTAL: CPT | Performed by: EMERGENCY MEDICINE

## 2019-09-20 PROCEDURE — 99396 PREV VISIT EST AGE 40-64: CPT | Performed by: EMERGENCY MEDICINE

## 2019-09-20 PROCEDURE — 80061 LIPID PANEL: CPT | Performed by: EMERGENCY MEDICINE

## 2019-09-20 PROCEDURE — 82306 VITAMIN D 25 HYDROXY: CPT | Performed by: EMERGENCY MEDICINE

## 2019-09-20 PROCEDURE — 36415 COLL VENOUS BLD VENIPUNCTURE: CPT | Performed by: EMERGENCY MEDICINE

## 2019-09-20 PROCEDURE — 80053 COMPREHEN METABOLIC PANEL: CPT | Performed by: EMERGENCY MEDICINE

## 2019-09-20 PROCEDURE — 90471 IMMUNIZATION ADMIN: CPT | Performed by: EMERGENCY MEDICINE

## 2019-09-20 PROCEDURE — 90686 IIV4 VACC NO PRSV 0.5 ML IM: CPT | Performed by: EMERGENCY MEDICINE

## 2019-09-20 NOTE — PATIENT INSTRUCTIONS
Thank you for choosing [facility]  To Do:  FOR LIZZY HOLLOWAY    · Continue with PT.  · Continue weight loss through weight loss clinic  · Follow up yearly or as needed  · Have blood tests done              Well Man Exam  Well balanced diet recom age who are overweight or obese and have 1 or more other risk factors for diabetes At least every 3 years (yearly if your blood sugar has already begun to rise)   Hepatitis C Men at increased risk for infection – talk with your healthcare provider At [address] this age group Once a year   Measles, mumps, rubella (MMR) Men in this age group through their late 46s who have no record of these infections or vaccines 1 or 2 doses; ask your healthcare provider   Meningococcal Men at increased risk for infection – talk

## 2019-09-20 NOTE — PROGRESS NOTES
Chief Complaint:   Patient presents with:  Physical: 3 month follow up     HPI:   This is a 48year old male who present for a yearly annual exam    WELL-MALE EXAM     1. Age:   48   2. Have you had any of the following problems:         a.  High bloo TOTAL HIP REPLACEMENT Left      Social History:  Social History    Tobacco Use      Smoking status: Never Smoker      Smokeless tobacco: Never Used    Alcohol use: No      Alcohol/week: 0.0 standard drinks    Drug use: No    Family History:  Family History bilateral  NECK: supple, no adenopathy, no thyromegaly  LUNGS: clear to auscultation, no RRW  CARDIO: RRR without murmur  EXTREMITIES: no cyanosis, clubbing or edema  GI:soft Nontender Normoactive BS.   No palpable masses no guarding rigidity no rebound ten

## 2019-09-24 ENCOUNTER — OFFICE VISIT (OUTPATIENT)
Dept: INTERNAL MEDICINE CLINIC | Facility: CLINIC | Age: 54
End: 2019-09-24
Payer: COMMERCIAL

## 2019-09-24 VITALS — WEIGHT: 270 LBS | BODY MASS INDEX: 39 KG/M2

## 2019-09-24 DIAGNOSIS — Z99.89 OSA ON CPAP: ICD-10-CM

## 2019-09-24 DIAGNOSIS — G47.33 OSA ON CPAP: ICD-10-CM

## 2019-09-24 DIAGNOSIS — E66.9 OBESITY, CLASS II, BMI 35-39.9: Primary | ICD-10-CM

## 2019-09-24 PROCEDURE — 97803 MED NUTRITION INDIV SUBSEQ: CPT | Performed by: DIETITIAN, REGISTERED

## 2019-09-24 NOTE — PROGRESS NOTES
FOLLOW UP NUTRITION CONSULTATION    Nutrition Assessment    Number of consultations with dietitian: 7    Height:  Ht Readings from Last 1 Encounters:  09/20/19 : 70\"      Weight:   Wt Readings from Last 2 Encounters:  09/24/19 : 270 lb  09/20/19 : 274 needed.         Venice Davidson MS, RD, LDN

## 2019-10-21 ENCOUNTER — TELEPHONE (OUTPATIENT)
Dept: INTERNAL MEDICINE CLINIC | Facility: CLINIC | Age: 54
End: 2019-10-21

## 2019-10-21 RX ORDER — PHENTERMINE HYDROCHLORIDE 15 MG/1
15 CAPSULE ORAL EVERY MORNING
Qty: 30 CAPSULE | Refills: 1 | OUTPATIENT
Start: 2019-10-21

## 2019-10-21 RX ORDER — TOPIRAMATE 50 MG/1
50 TABLET, FILM COATED ORAL 2 TIMES DAILY
Qty: 60 TABLET | Refills: 1 | Status: SHIPPED | OUTPATIENT
Start: 2019-10-21

## 2019-10-21 NOTE — TELEPHONE ENCOUNTER
Patient needs as late as possible - like a 5 pm appointment. You are booked 12/2/19 and 12/3/19. Can you recommend where to put him?   He is doing well with meds and does need refills of topamax and phentermine    Requesting Topamax and Phentermine  LOV:

## 2019-10-21 NOTE — TELEPHONE ENCOUNTER
Can we reschedule him for Dec 3rd? Is he doing okay with weight loss and dose of medications?   Ok to refill until follow up

## 2020-02-26 ENCOUNTER — TELEPHONE (OUTPATIENT)
Dept: INTERNAL MEDICINE CLINIC | Facility: CLINIC | Age: 55
End: 2020-02-26

## 2020-02-26 NOTE — TELEPHONE ENCOUNTER
Dr. Michel Jose wanted a status check on patient since she last saw the patient in August.  Patient said he is busy with his new job, a lot of travelling, a little stress but otherwise okay. He declines an appointment and will call us if he wants to make one.

## (undated) DEVICE — 3M™ MICROFOAM™ TAPE 1528-4: Brand: 3M™ MICROFOAM™

## (undated) DEVICE — PIN STEINMAN SMOOTH 1/8 9

## (undated) DEVICE — SUTURE FIBERWIRE 2 AR-7202

## (undated) DEVICE — PILLOW ABDUCTION HIP SM

## (undated) DEVICE — SOL  .9 1000ML BAG

## (undated) DEVICE — BLADE ELECTRODE: Brand: EDGE

## (undated) DEVICE — KENDALL SCD EXPRESS SLEEVES, KNEE LENGTH, MEDIUM: Brand: KENDALL SCD

## (undated) DEVICE — TOTAL HIP CDS: Brand: MEDLINE INDUSTRIES, INC.

## (undated) DEVICE — GAMMEX® NON-LATEX PI ORTHO SIZE 8, STERILE POLYISOPRENE POWDER-FREE SURGICAL GLOVE: Brand: GAMMEX

## (undated) DEVICE — SPONGE RAYTEC 4X4 RF DETECT

## (undated) DEVICE — SPECIMEN CONTAINER,POSITIVE SEAL INDICATOR, OR PACKAGED: Brand: PRECISION

## (undated) DEVICE — DECANTER BAG 9": Brand: MEDLINE INDUSTRIES, INC.

## (undated) DEVICE — VIOLET BRAIDED (POLYGLACTIN 910), SYNTHETIC ABSORBABLE SUTURE: Brand: COATED VICRYL

## (undated) DEVICE — WRAP COOLING HIP W/ICE PILLOW

## (undated) DEVICE — STERILE POLYISOPRENE POWDER-FREE SURGICAL GLOVES: Brand: PROTEXIS

## (undated) DEVICE — GLOVE SURG TRIUMPH SZ 8-1/2

## (undated) DEVICE — GOWN,SIRUS,FABRIC-REINFORCED,X-LARGE: Brand: MEDLINE

## (undated) DEVICE — DRAPE,U/SHT,SPLIT,FILM,60X84,STERILE: Brand: MEDLINE

## (undated) DEVICE — SUTURE VICRYL 2-0 CT-1

## (undated) DEVICE — 3M™ COBAN™ NL STERILE NON-LATEX SELF-ADHERENT WRAP, 2084S, 4 IN X 5 YD (10 CM X 4,5 M), 18 ROLLS/CASE: Brand: 3M™ COBAN™

## (undated) DEVICE — DRESSING AQUACEL AG 3.5 X 10

## (undated) DEVICE — MLPD DISPOSABLE PAD (6' ROLL) 3 ROLLS: Brand: SCHAERER MEDICAL USA

## (undated) DEVICE — Device

## (undated) DEVICE — Device: Brand: STABLECUT®

## (undated) DEVICE — HOOD, PEEL-AWAY: Brand: FLYTE

## (undated) DEVICE — 450 ML BOTTLE OF 0.05% CHLORHEXIDINE GLUCONATE IN 99.95% STERILE WATER FOR IRRIGATION, USP AND APPLICATOR.: Brand: IRRISEPT ANTIMICROBIAL WOUND LAVAGE

## (undated) NOTE — LETTER
02/26/18        [address] [place] [id number]      Dear Edward Franz,    [facility] records indicate that you have outstanding lab work and or testing that was ordered for you and has not yet been completed:          CBC With Diff      CMP

## (undated) NOTE — LETTER
11/01/18        [address] [place] [id number]      Dear Lang Mariee,    [address] [facility] records indicate that you have outstanding lab work and or testing that was ordered for you and has not yet been completed:  Orders Placed This Encounter

## (undated) NOTE — LETTER
[facility] Testing Department  Phone: [phone]  Right Fax: [phone]  ABNORMAL VALUES FAX    Sent By:  Dolores Dale RN Date: 1/22/19    Patient Name: Jason Flower  Surgery Date: 2/7/2019    CSN: [id number]  Medical Record: [id number]

## (undated) NOTE — IP AVS SNAPSHOT
[address]            (For Outpatient Use Only) Initial Admit Date: 2/7/2019   Inpt/Obs Admit Date: Inpt: 2/7/19 / Obs: N/A   Discharge Date:    Tayla Brown:  [de-identified]   MRN: [de-identified]   CSN: [id number]        ENCOUNTER  Patient C Hospital Account Financial Class: [facility] [place], [place]    February 11, 2019

## (undated) NOTE — LETTER
[facility] Testing Department  Phone: [phone]  OUTSIDE TESTING RESULT REQUEST      TO:   DR Rosa Hawley     Today's Date: 4/18/19  FAX #: [phone]     IMPORTANT: FOR YOUR IMMEDIATE ATTENTION  Please FAX all test results listed below to: 6

## (undated) NOTE — IP AVS SNAPSHOT
Patient Demographics     Address  [place] [address] Phone  [phone] [facility])  [phone] (Work)  [phone] (Mobile) *Preferred* E-mail Address  [email or web address]      Emergency Contact(s)     Name Relation Home Work Mobile ? For hip replacement surgery, follow instructions provided by physical therapy    No smoking  ? Avoid smoking. It is known to cause breathing problems and can decrease the rate of healing. Incision care/Dressing changes  ?  Wash hands before and after d ? Surgical discomfort is normal for one to two months. ? Have realistic goals and keep a positive outlook. ? Keep pain manageable; pain should not disrupt your sleep or activities like getting out of bed or walking.   ? You may need pain medication regula or are visiting. ? Keep bed linen/clothing freshly laundered. ? Do not allow others or pets to touch your incision. ? Avoid people that have colds or the flu. ?  Your surgeon may recommend that you take antibiotics before you undergo any dental or other ? Turning in or out of surgical leg that is new  ? Increased numbness, tingling to leg  ? Inability to walk or put weight on your surgical leg      Signs of blood clot  ?  Pain, excessive tenderness, redness, or swelling in leg or calf (other than incision [email or web address]       Tubigrip (compression sleeve) for HIPS    ? All patients should wear the compression sleeve until first follow up visit to [address] office (about 2-3 weeks) and then check with surgeon if need to continue use. ?  Take Jayla Chavez MD         Ferrous Sulfate 325 (65 Fe) MG Tabs  Next dose due: Tomorrow morning      Take 1 tablet (325 mg total) by mouth daily.    Jayla Chavez MD         HYDROcodone-acetaminophen  MG Tabs  Commonly known as:  [address] [id number] HYDROcodone-acetaminophen (NORCO)  MG per tab 2 tablet 02/11/19 0351 Given      [id number] HYDROcodone-acetaminophen (NORCO)  MG per tab 2 tablet 02/11/19 0931 Given      [id number] HYDROcodone-acetaminophen (NORCO)  MG per tab 2 Description: 48year old male Provider: Finn Finley MD Department: [facility] [place] [facility]   Scanning Cover Sheet     Click to print [address] for scanning   Office Visit     1/17/2019  [address] [facility] [facility] lisinopril 10 MG Oral Tab TAKE 1 TABLET(10 MG) BY MOUTH DAILY Disp: 90 tablet Rfl: 0   TraMADol HCl 50 MG Oral Tab 1 tab po q 6 hrs prn pain Disp: 120 tablet Rfl: 5   Meloxicam 15 MG Oral Tab Take 1 tablet (15 mg total) by mouth daily as needed for Pain.  ( Recent Results (from the past 336 hour(s))   COMP METABOLIC PANEL (14)     Collection Time: 01/17/19 10:38 AM   Result Value Ref Range     Glucose 76 70 - 99 mg/dL     Sodium 141 136 - 144 mmol/L     Potassium 3.7 3.6 - 5.1 mmol/L     Chloride 107 101 - 11   Neutrophil Absolute Prelim 2.73 1.30 - 6.70 x10 (3) uL     Neutrophil Absolute 2.73 1.30 - 6.70 x10(3) uL     Lymphocyte Absolute 2.02 0.90 - 4.00 x10(3) uL     Monocyte Absolute 0.50 0.10 - 1.00 x10(3) uL     Eosinophil Absolute 0.11 0.00 - 0.30 x10(3) Attribution Carvajal    JL. 1 - Pat Beltre MD on 2/7/2019  6:37 AM                        Consults - MD Consult Notes      Consults signed by Claudia Brown MD at 2/7/2019  4:33 PM     Author:  Claudia Brown MD Service:  Hospitalist Author Type:  Phys smokeless tobacco. He reports that he does not drink alcohol or use drugs. [TP.2]    Family History:[TP.1]   Family History   Problem Relation Age of Onset   • Heart Disorder Father[TP.2]        Allergies:[TP.1] No Known Allergies[TP.2]    Medications:[TP.1 Neurologic: No focal neurological deficits. CNII-XII grossly intact. Musculoskeletal: Moves all extremities. Extremities: No edema or cyanosis. Integument: No rashes or lesions. Psychiatric: Appropriate mood and affect.       Diagnostic Data:      Labs NANDO (obstructive sleep apnea)    Osteoarthritis of left hip      Past Medical History   Past Medical History:   Diagnosis Date   • Essential hypertension    • Hyperlipidemia    • Morbid obesity ([facility] [address] 11/18/2014   • Obesity    • Osteoarthritis     both hi Gait Assessment   Gait Assistance: Supervision  Distance (ft): 150  Assistive Device: Rolling walker  Pattern: R Decreased stance time;R Foot flat;L Foot flat  Stoop/Curb Assistance: Contact guard assist  Comment : scores per FIMS scale    Skilled Therapy CURRENT GOALS  Goal #1  Patient is able to demonstrate supine - sit EOB @ level: supervision      Goal #2  Patient is able to demonstrate transfers Sit to/from Stand at assistance level: supervison  Met on 2/8/19  Updated to mod ind level      Goal #3 • OTHER SURGICAL HISTORY      Cyst on Jaw[BR.3]       SUBJECTIVE  \"[BR.1] I can't go home yet because I need to be able to do stairs with one rail. \"[BR.2]    Patient’s self-stated goal is to go to rehab.     OBJECTIVE[BR.1]  Precautions: SYED - anterior(no exercises and gait training. Pt recalls 4/4 hip precautions for anterior approach and no active hip ABD.[BR.1] Pt performed exercises per SYED protocol with supervision. Pt is at sup level for sit to stand. Pt ambulated with RW and sup 150'.  Pt reported of and stair negotiation for safe return home where pt is alone and must negotiate 14 steps to bedroom for safe return home.        DISCHARGE RECOMMENDATIONS[BR.1]  PT Discharge Recommendations: Sub-acute rehabilitation[BR.3]    PLAN[BR.1]  PT Treatment Plan: Presenting Problem: S/p Left SYED on 02/07/19[MD.2]    Problem List[MD.1]  Active Problems:    NANDO (obstructive sleep apnea)    Osteoarthritis of left hip[MD.2]      Past Medical History[MD.1]   Past Medical History:   Diagnosis Date   • Essential hypertens -   Climbing 3-5 steps with a railing?: A Lot[MD.2]       AM-PAC Score:[MD.1]  Raw Score: 17   Approx Degree of Impairment: 50.57%   Standardized Score (AM-PAC Scale): 42.13   CMS Modifier (G-Code): CK[MD.2]    FUNCTIONAL ABILITY STATUS  Gait Assessment[MD Pt is progressing with mobility s/p L SYED 2/7/19. Pt continues to require cueing for gait technique and occasional cueing to maintain precautions. Pt continues to refuse to participate in stair training due to R hip arthritis/pain this session.  Pt with lance MD.1 - Aline Phillips, PT on 2/9/2019  3:30 PM  MD.2 - Aline Phillips, PT on 2/9/2019  3:31 PM                        Occupational Therapy Notes (last 72 hours) (Notes from 2/8/2019  4:00 PM through 2/11/2019  4:00 PM)      Occupational Therapy Note signed by ACTIVITIES OF DAILY LIVING ASSESSMENT  AM-PAC ‘6-Clicks’ Inpatient Daily Activity Short Form  How much help from another person does the patient currently need…[MS.1]  -   Putting on and taking off regular lower body clothing?: None  -   Bathing (including patient remains well below his baseline in these and other functional areas.  Patient would benefit from continued OT services during hospital stay to further address these deficits and assist patient in returning to prior level of function.  Despite patien :  Colonel Shantel OT (Occupational Therapist)       OCCUPATIONAL THERAPY TREATMENT NOTE - INPATIENT     Room Number: 363/363-A  Session: 1   Number of Visits to Meet Established Goals: 3    Presenting Problem: (L SYED)    History related to -   Putting on and taking off regular upper body clothing?: None  -   Taking care of personal grooming such as brushing teeth?: A Little  -   Eating meals?: None    AM-PAC Score:  Score: 20  Approx Degree of Impairment: 38.32%  Standardized Score (AM-PAC S education;Patient/Family training;Equipment eval/education; Compensatory technique education  Rehab Potential : Good  Frequency (Obs): Daily      OT Goals: progressing 2/9/18  ADL GOALS   Patient will perform Lower Body Dressing with supervision  Patient wi 3/8/2019 1:00 PM [facility] [address]    3/11/2019 11:00 AM Aubrey Zavaleta, PT [facility] PHYSICAL THERAPY [address]    3/12/2019 3:00 PM Gale Awad RD [facility],

## (undated) NOTE — LETTER
Date: 6/20/2018    Patient Name: Evelio Hayes          To Whom it may concern: This letter has been written at the patient's request. The above patient was seen at the [facility] for treatment of a medical condition.   Due to patient's cu

## (undated) NOTE — Clinical Note
Hi! Patient has lost 116 lbs under my care at the [facility] loss clinic. He is seeing you next week! Please keep in mind for any of your [facility] patients that need to lose weight prior to surgery. Thanks!  Shilpa Magana

## (undated) NOTE — LETTER
[address] [address], [address] [place] [address], [address] [place] [address]            December 31, 2018      [id number]      Dear Mr. Martha Rhoades

## (undated) NOTE — LETTER
19    Patient: Lieutenant Montano  : 1965 Visit date: 2019    Dear  Dr. Rhonda Cazares MD,    Thank you for referring Debbiewei [place] to my practice. Please find my assessment and plan below.           Assessment   Encounter for screening colonos We will give him ampicillin and gentamicin prior to the procedure. All risks, benefits, complications and alternatives to the proposed procedure were fully discussed with the patient. All questions from the patient were answered in detail.  A description